# Patient Record
Sex: FEMALE | Race: WHITE | Employment: UNEMPLOYED | URBAN - METROPOLITAN AREA
[De-identification: names, ages, dates, MRNs, and addresses within clinical notes are randomized per-mention and may not be internally consistent; named-entity substitution may affect disease eponyms.]

---

## 2017-03-13 ENCOUNTER — OFFICE VISIT (OUTPATIENT)
Dept: INTERNAL MEDICINE CLINIC | Age: 57
End: 2017-03-13

## 2017-03-13 VITALS
RESPIRATION RATE: 16 BRPM | DIASTOLIC BLOOD PRESSURE: 76 MMHG | HEIGHT: 61 IN | BODY MASS INDEX: 23.86 KG/M2 | OXYGEN SATURATION: 96 % | WEIGHT: 126.4 LBS | SYSTOLIC BLOOD PRESSURE: 126 MMHG | HEART RATE: 79 BPM | TEMPERATURE: 98.5 F

## 2017-03-13 DIAGNOSIS — F51.01 PRIMARY INSOMNIA: ICD-10-CM

## 2017-03-13 DIAGNOSIS — E03.9 ACQUIRED HYPOTHYROIDISM: ICD-10-CM

## 2017-03-13 DIAGNOSIS — M54.2 NECK PAIN: Primary | ICD-10-CM

## 2017-03-13 DIAGNOSIS — Z11.59 NEED FOR HEPATITIS C SCREENING TEST: ICD-10-CM

## 2017-03-13 DIAGNOSIS — R00.2 PALPITATIONS: ICD-10-CM

## 2017-03-13 RX ORDER — ZOLPIDEM TARTRATE 10 MG/1
5-10 TABLET ORAL
Qty: 30 TAB | Refills: 1 | Status: SHIPPED | OUTPATIENT
Start: 2017-03-13 | End: 2018-06-18 | Stop reason: SDUPTHER

## 2017-03-13 NOTE — PROGRESS NOTES
HISTORY OF PRESENT ILLNESS  Andriette Socorro is a 60 y.o. female. HPI  Presents with several weeks of feeling of heart beating hard in her chest. Some left neck discomfort as well. No relation to exertion. No headache or dizziness. She does have a large family history of CAD in her brother and mom. No exertional SOB. NO PND or orthopnea. No edema. No change in bowels or bladder. No fever or chills. Some issues with stress as well as insomnia and trouble going to sleep mostly. Some staying asleep issues as well. 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 90 Tab 3     No current facility-administered medications on file prior to visit.         ROS  Per HPI  Physical Exam  Physical Examination: General appearance - alert, well appearing, and in no distress  Neck - supple, no significant adenopathy, carotids upstroke normal bilaterally, no bruits, thyroid exam: thyroid is normal in size without nodules or tenderness  Lymphatics - no palpable lymphadenopathy, no hepatosplenomegaly  Chest - clear to auscultation, no wheezes, rales or rhonchi, symmetric air entry  Heart - normal rate, regular rhythm, normal S1, S2, no murmurs, rubs, clicks or gallops  Abdomen - soft, nontender, nondistended, no masses or organomegaly  Neurological - alert, oriented, normal speech, no focal findings or movement disorder noted  Musculoskeletal - no joint tenderness, deformity or swelling  Extremities - peripheral pulses normal, no pedal edema, no clubbing or cyanosis  EKG: normal EKG, normal sinus rhythm, unchanged from previous tracings    Ivelisse King MD      ASSESSMENT and PLAN  Lazarus Rattan was seen today for palpitations, neck pain, hypothyroidism and labs. Diagnoses and all orders for this visit:    Neck pain - concern for occult CAD versus musculoskeletal apin  -     AMB POC EKG ROUTINE W/ 12 LEADS, INTER & REP  -     ECHO TTE STRESS EXRCSE COMP W OR WO CONTR; Future    Need for hepatitis C screening test  -     HEPATITIS C AB    Palpitations - appear short lived and benign  -     AMB POC EKG ROUTINE W/ 12 LEADS, INTER & REP  -     CBC WITH AUTOMATED DIFF  -     LIPID PANEL  -     METABOLIC PANEL, COMPREHENSIVE  -     UA/M W/RFLX CULTURE, ROUTINE  -     ECHO TTE STRESS EXRCSE COMP W OR WO CONTR; Future    Acquired hypothyroidism - check levels and adjust meds. -     T4, FREE  -     TSH 3RD GENERATION    Primary insomnia - trial of ambien and call if not helping. Other orders  -     zolpidem (AMBIEN) 10 mg tablet; Take 0.5-1 Tabs by mouth nightly as needed for Sleep. Max Daily Amount: 10 mg. Follow-up Disposition: Not on File   Advised her to call back or return to office if symptoms worsen/change/persist.  Discussed expected course/resolution/complications of diagnosis in detail with patient. Medication risks/benefits/costs/interactions/alternatives discussed with patient. She was given an after visit summary which includes diagnoses, current medications, & vitals. She expressed understanding with the diagnosis and plan.

## 2017-03-13 NOTE — MR AVS SNAPSHOT
Visit Information Date & Time Provider Department Dept. Phone Encounter #  
 3/13/2017 11:00 AM Christine Su MD [facility] Internal Medicine [phone] [id number] Upcoming Health Maintenance Date Due Hepatitis C Screening 1960 Pneumococcal 19-64 Highest Risk (1 of 3 - PCV13) 11/15/1977 DTaP/Tdap/Td series (1 - Tdap) 8/2/2003 INFLUENZA AGE 9 TO ADULT 8/1/2016 PAP AKA CERVICAL CYTOLOGY 10/20/2017 BREAST CANCER SCRN MAMMOGRAM 11/21/2017 COLONOSCOPY 9/20/2020 Allergies as of 3/13/2017  Review Complete On: 3/13/2017 By: Eula Marcial LPN Severity Noted Reaction Type Reactions Terpin Hydrate High 10/01/2010    Swelling Throat swelling Current Immunizations  Reviewed on 7/6/2015 Name Date  
 TD Vaccine 8/1/2003 Not reviewed this visit You Were Diagnosed With   
  
 Codes Comments Neck pain    -  Primary ICD-10-CM: M54.2 ICD-9-CM: 723.1 Need for hepatitis C screening test     ICD-10-CM: Z11.59 
ICD-9-CM: V73.89 Palpitations     ICD-10-CM: R00.2 ICD-9-CM: 785.1 Acquired hypothyroidism     ICD-10-CM: E03.9 ICD-9-CM: 048. 9 Vitals BP Pulse Temp Resp Height(growth percentile) Weight(growth percentile) 126/76 79 98.5 °F (36.9 °C) (Oral) 16 5' 1.13\" (1.553 m) 126 lb 6.4 oz (57.3 kg) LMP SpO2 BMI OB Status Smoking Status 05/04/2010 96% 23.78 kg/m2 Postmenopausal Never Smoker Vitals History BMI and BSA Data Body Mass Index Body Surface Area 23.78 kg/m 2 1.57 m 2 Preferred Pharmacy Pharmacy Name Phone [id number] [address], [address] [facility] [phone] Your Updated Medication List  
  
   
This list is accurate as of: 3/13/17 11:47 AM.  Always use your most recent med list.  
  
  
  
  
 levothyroxine 75 mcg tablet Commonly known as:  SYNTHROID Take 1 Tab by mouth Daily (before breakfast). We Performed the Following AMB POC EKG ROUTINE W/ 12 LEADS, INTER & REP [69267 CPT(R)] CBC WITH AUTOMATED DIFF [50833 CPT(R)] HEPATITIS C AB [04869 CPT(R)] LIPID PANEL [44000 CPT(R)] METABOLIC PANEL, COMPREHENSIVE [21488 CPT(R)] T4, FREE [id number] CPT(R)] TSH 3RD GENERATION [91443 CPT(R)] UA/M W/RFLX CULTURE, ROUTINE [[id number] Custom] To-Do List   
 03/13/2017 ECHO:  ECHO TTE STRESS EXRCSE COMP W OR WO CONTR Introducing \[facility]\"" & HEALTH SERVICES! Dear Enrico Ireland: Thank you for requesting a [employer] account. Our records indicate that you already have an active [employer] account. You can access your account anytime at [email or web address] Did you know that you can access your hospital and ER discharge instructions at any time in [employer]? You can also review all of your test results from your hospital stay or ER visit. Additional Information If you have questions, please visit the Frequently Asked Questions section of the [employer] website at [email or web address] Remember, [employer] is NOT to be used for urgent needs. For medical emergencies, dial 911. Now available from your iPhone and Android! Please provide this summary of care documentation to your next provider. Your primary care clinician is listed as Shaylee [phone] If you have any questions after today's visit, please call [phone].

## 2017-03-13 NOTE — PROGRESS NOTES
Chief Complaint   Patient presents with    Palpitations     intermittent for \"a couple of months\"      Neck Pain     left sided yesterday dull ache lasting 30 mintutes     Hypothyroidism    Labs

## 2017-03-14 ENCOUNTER — TELEPHONE (OUTPATIENT)
Dept: INTERNAL MEDICINE CLINIC | Age: 57
End: 2017-03-14

## 2017-03-14 LAB
ALBUMIN SERPL-MCNC: 4.7 G/DL (ref 3.5–5.5)
ALBUMIN/GLOB SERPL: 1.5 {RATIO} (ref 1.2–2.2)
ALP SERPL-CCNC: 77 IU/L (ref 39–117)
ALT SERPL-CCNC: 22 IU/L (ref 0–32)
APPEARANCE UR: CLEAR
AST SERPL-CCNC: 18 IU/L (ref 0–40)
BACTERIA #/AREA URNS HPF: NORMAL /[HPF]
BASOPHILS # BLD AUTO: 0 X10E3/UL (ref 0–0.2)
BASOPHILS NFR BLD AUTO: 0 %
BILIRUB SERPL-MCNC: 0.5 MG/DL (ref 0–1.2)
BILIRUB UR QL STRIP: NEGATIVE
BUN SERPL-MCNC: 14 MG/DL (ref 6–24)
BUN/CREAT SERPL: 20 (ref 9–23)
CALCIUM SERPL-MCNC: 9.7 MG/DL (ref 8.7–10.2)
CASTS URNS QL MICRO: NORMAL /LPF
CHLORIDE SERPL-SCNC: 99 MMOL/L (ref 96–106)
CHOLEST SERPL-MCNC: 213 MG/DL (ref 100–199)
CO2 SERPL-SCNC: 24 MMOL/L (ref 18–29)
COLOR UR: YELLOW
CREAT SERPL-MCNC: 0.69 MG/DL (ref 0.57–1)
EOSINOPHIL # BLD AUTO: 0 X10E3/UL (ref 0–0.4)
EOSINOPHIL NFR BLD AUTO: 0 %
EPI CELLS #/AREA URNS HPF: NORMAL /HPF
ERYTHROCYTE [DISTWIDTH] IN BLOOD BY AUTOMATED COUNT: 13.2 % (ref 12.3–15.4)
GLOBULIN SER CALC-MCNC: 3.2 G/DL (ref 1.5–4.5)
GLUCOSE SERPL-MCNC: 86 MG/DL (ref 65–99)
GLUCOSE UR QL: NEGATIVE
HCT VFR BLD AUTO: 41.1 % (ref 34–46.6)
HCV AB S/CO SERPL IA: 0.1 S/CO RATIO (ref 0–0.9)
HDLC SERPL-MCNC: 63 MG/DL
HGB BLD-MCNC: 13.8 G/DL (ref 11.1–15.9)
HGB UR QL STRIP: ABNORMAL
IMM GRANULOCYTES # BLD: 0 X10E3/UL (ref 0–0.1)
IMM GRANULOCYTES NFR BLD: 0 %
KETONES UR QL STRIP: NEGATIVE
LDLC SERPL CALC-MCNC: 131 MG/DL (ref 0–99)
LEUKOCYTE ESTERASE UR QL STRIP: NEGATIVE
LYMPHOCYTES # BLD AUTO: 1.8 X10E3/UL (ref 0.7–3.1)
LYMPHOCYTES NFR BLD AUTO: 23 %
MCH RBC QN AUTO: 30.2 PG (ref 26.6–33)
MCHC RBC AUTO-ENTMCNC: 33.6 G/DL (ref 31.5–35.7)
MCV RBC AUTO: 90 FL (ref 79–97)
MICRO URNS: ABNORMAL
MONOCYTES # BLD AUTO: 0.6 X10E3/UL (ref 0.1–0.9)
MONOCYTES NFR BLD AUTO: 7 %
MUCOUS THREADS URNS QL MICRO: PRESENT
NEUTROPHILS # BLD AUTO: 5.3 X10E3/UL (ref 1.4–7)
NEUTROPHILS NFR BLD AUTO: 70 %
NITRITE UR QL STRIP: NEGATIVE
PH UR STRIP: 6 [PH] (ref 5–7.5)
PLATELET # BLD AUTO: 286 X10E3/UL (ref 150–379)
POTASSIUM SERPL-SCNC: 4 MMOL/L (ref 3.5–5.2)
PROT SERPL-MCNC: 7.9 G/DL (ref 6–8.5)
PROT UR QL STRIP: NEGATIVE
RBC # BLD AUTO: 4.57 X10E6/UL (ref 3.77–5.28)
RBC #/AREA URNS HPF: NORMAL /HPF
SODIUM SERPL-SCNC: 141 MMOL/L (ref 134–144)
SP GR UR: 1.01 (ref 1–1.03)
T4 FREE SERPL-MCNC: 1.42 NG/DL (ref 0.82–1.77)
TRIGL SERPL-MCNC: 95 MG/DL (ref 0–149)
TSH SERPL DL<=0.005 MIU/L-ACNC: 0.03 UIU/ML (ref 0.45–4.5)
URINALYSIS REFLEX, 377202: ABNORMAL
UROBILINOGEN UR STRIP-MCNC: 0.2 MG/DL (ref 0.2–1)
VLDLC SERPL CALC-MCNC: 19 MG/DL (ref 5–40)
WBC # BLD AUTO: 7.7 X10E3/UL (ref 3.4–10.8)
WBC #/AREA URNS HPF: NORMAL /HPF

## 2017-03-14 NOTE — TELEPHONE ENCOUNTER
Patient asked for a callback from Dr. Samantha Iniguez. Says there is something about her exam she forgot to ask him yesterday. Would not say what it was about.

## 2017-03-31 ENCOUNTER — CLINICAL SUPPORT (OUTPATIENT)
Dept: CARDIOLOGY CLINIC | Age: 57
End: 2017-03-31

## 2017-03-31 DIAGNOSIS — R07.89 OTHER CHEST PAIN: Primary | ICD-10-CM

## 2017-04-03 ENCOUNTER — OFFICE VISIT (OUTPATIENT)
Dept: INTERNAL MEDICINE CLINIC | Age: 57
End: 2017-04-03

## 2017-04-03 VITALS
TEMPERATURE: 98 F | OXYGEN SATURATION: 98 % | BODY MASS INDEX: 23.33 KG/M2 | SYSTOLIC BLOOD PRESSURE: 122 MMHG | HEIGHT: 61 IN | RESPIRATION RATE: 14 BRPM | HEART RATE: 67 BPM | WEIGHT: 123.6 LBS | DIASTOLIC BLOOD PRESSURE: 84 MMHG

## 2017-04-03 DIAGNOSIS — G90.01 CAROTIDYNIA: ICD-10-CM

## 2017-04-03 DIAGNOSIS — K62.5 RECTAL BLEEDING: Primary | ICD-10-CM

## 2017-04-03 DIAGNOSIS — M54.2 NECK PAIN: ICD-10-CM

## 2017-04-03 RX ORDER — IBUPROFEN 200 MG
400 TABLET ORAL
COMMUNITY
Start: 2017-04-03 | End: 2018-12-13

## 2017-04-03 NOTE — MR AVS SNAPSHOT
Visit Information Date & Time Provider Department Dept. Phone Encounter #  
 4/3/2017 11:15 AM Mariajose Lake MD [address] [address] Internal Medicine [phone] [id number] Upcoming Health Maintenance Date Due Pneumococcal 19-64 Highest Risk (1 of 3 - PCV13) 11/15/1977 DTaP/Tdap/Td series (1 - Tdap) 8/2/2003 PAP AKA CERVICAL CYTOLOGY 10/20/2017 BREAST CANCER SCRN MAMMOGRAM 11/21/2017 COLONOSCOPY 9/20/2020 Allergies as of 4/3/2017  Review Complete On: 4/3/2017 By: Silvestre Cobb LPN Severity Noted Reaction Type Reactions Terpin Hydrate High 10/01/2010    Swelling Throat swelling Current Immunizations  Reviewed on 7/6/2015 Name Date  
 TD Vaccine 8/1/2003 Not reviewed this visit You Were Diagnosed With   
  
 Codes Comments Rectal bleeding    -  Primary ICD-10-CM: K62.5 ICD-9-CM: 569.3 Carotidynia     ICD-10-CM: G90.01 
ICD-9-CM: 337.01 Neck pain     ICD-10-CM: M54.2 ICD-9-CM: 723.1 Vitals BP Pulse Temp Resp Height(growth percentile) Weight(growth percentile) 122/84 67 98 °F (36.7 °C) (Oral) 14 5' 1.1\" (1.552 m) 123 lb 9.6 oz (56.1 kg) LMP SpO2 BMI OB Status Smoking Status 05/04/2010 98% 23.28 kg/m2 Postmenopausal Never Smoker Vitals History BMI and BSA Data Body Mass Index Body Surface Area  
 23.28 kg/m 2 1.56 m 2 Preferred Pharmacy Pharmacy Name Phone [id number] [address] [phone] Your Updated Medication List  
  
   
This list is accurate as of: 4/3/17 12:57 PM.  Always use your most recent med list. ADVIL 200 mg tablet Generic drug:  ibuprofen Take 2 Tabs by mouth two (2) times daily (after meals). levothyroxine 75 mcg tablet Commonly known as:  SYNTHROID Take 1 Tab by mouth Daily (before breakfast). zolpidem 10 mg tablet Commonly known as:  AMBIEN  
 Take 0.5-1 Tabs by mouth nightly as needed for Sleep. Max Daily Amount: 10 mg. We Performed the Following REFERRAL TO COLON AND RECTAL SURGERY [REF17 Custom] Comments:  
 Please evaluate patient for rectal bleeding. To-Do List   
 04/03/2017 Imaging:  DUPLEX CAROTID BILATERAL Referral Information Referral ID Referred By Referred To  
  
 [id number] Gabe Carrel, [facility] [address] [place], [address] Visits Status Start Date End Date 1 New Request 4/3/17 4/3/18 If your referral has a status of pending review or denied, additional information will be sent to support the outcome of this decision. Introducing [facility] & HEALTH SERVICES! Dear Zechariah Mckeon: Thank you for requesting a [employer] account. Our records indicate that you already have an active [employer] account. You can access your account anytime at [email or web address] Did you know that you can access your hospital and ER discharge instructions at any time in [employer]? You can also review all of your test results from your hospital stay or ER visit. Additional Information If you have questions, please visit the Frequently Asked Questions section of the [employer] website at [email or web address] Remember, [employer] is NOT to be used for urgent needs. For medical emergencies, dial 911. Now available from your iPhone and Android! Please provide this summary of care documentation to your next provider. Your primary care clinician is listed as Shaylee [phone] If you have any questions after today's visit, please call [phone].

## 2017-04-04 NOTE — PROGRESS NOTES
HISTORY OF PRESENT ILLNESS  Estella Elizalde is a 60 y.o. female. HPI  Presents for a followup of recent testing. Stress test was fine. Ongoing issues with left neck soreness and some burning pain. Also some left ear pulsing and taping mostly noted when she exerts herself on two occasions. No dizziness. Some right ear tinnitus for years. No exertional chest pain or SOB. No cough or wheeze. No fever or chills. No palpitations. Past Medical History:   Diagnosis Date    Cancer ([facility]) 6/2010     Squamous cell face    Diverticulosis     Environmental allergies     melisa hydrate    MVP (mitral valve prolapse)     Thyroid disease     hypo    Unspecified hypothyroidism 1/31/2011     Past Surgical History:   Procedure Laterality Date    ENDOSCOPY, COLON, DIAGNOSTIC  8/16/2010    10 year fu -- Howie Opitz ORAL SURGERY PROCEDURE      [place] MOHS, 1 STAGE, HEAD/NECK/HAND/FEET/GENTIAL       Current Outpatient Prescriptions on File Prior to Visit   Medication Sig Dispense Refill    zolpidem (AMBIEN) 10 mg tablet Take 0.5-1 Tabs by mouth nightly as needed for Sleep. Max Daily Amount: 10 mg. 30 Tab 1    levothyroxine (SYNTHROID) 75 mcg tablet Take 1 Tab by mouth Daily (before breakfast). 90 Tab 3     No current facility-administered medications on file prior to visit. ROS  Per HPI. Some bleeding with each BM on the tissue only. No melena or pain. Physical Exam  Physical Examination: Mental status - alert, oriented to person, place, and time  Ears - bilateral TM's and external ear canals normal  Nose - normal and patent, no erythema, discharge or polyps  Mouth - mucous membranes moist, pharynx normal without lesions  Neck - soreness along the left carotid but no mass.    Lymphatics - no palpable lymphadenopathy, no hepatosplenomegaly  Chest - clear to auscultation, no wheezes, rales or rhonchi, symmetric air entry  Heart - normal rate, regular rhythm, normal S1, S2, no murmurs, rubs, clicks or gallops  Abdomen - soft, nontender, nondistended, no masses or organomegaly  Extremities - peripheral pulses normal, no pedal edema, no clubbing or cyanosis    ASSESSMENT and PLAN  Abner was seen today for neck pain. Diagnoses and all orders for this visit:    Rectal bleeding - hemorrhoids - will see surgery to address  -     REFERRAL TO COLON AND RECTAL SURGERY    Carotidynia - unclear etiology. -     DUPLEX CAROTID BILATERAL; Future    Neck pain - per above. -     DUPLEX CAROTID BILATERAL; Future   Likely tinnitus - if the above is negative. Will get ENT involved and consider MRI and MRA. Follow-up Disposition: after the above. Advised her to call back or return to office if symptoms worsen/change/persist.  Discussed expected course/resolution/complications of diagnosis in detail with patient. Medication risks/benefits/costs/interactions/alternatives discussed with patient. She was given an after visit summary which includes diagnoses, current medications, & vitals. She expressed understanding with the diagnosis and plan.

## 2017-04-07 ENCOUNTER — CLINICAL SUPPORT (OUTPATIENT)
Dept: CARDIOLOGY CLINIC | Age: 57
End: 2017-04-07

## 2017-04-07 DIAGNOSIS — R09.89 LEFT CAROTID BRUIT: Primary | ICD-10-CM

## 2017-04-07 DIAGNOSIS — I65.22 CAROTID ARTERY PLAQUE, LEFT: ICD-10-CM

## 2017-04-07 NOTE — PROCEDURES
[facility]  *** FINAL REPORT ***    Name: Ronel Spears  MRN: [id number]        Outpatient  : 15 Nov 1960  HIS Order #: [id number]  [id number] [facility] Visit #: [id number]  Date: 2017    TYPE OF TEST: Cerebrovascular Duplex    REASON FOR TEST  Pulsing in left ear / soreness along right carotid artery    Right Carotid:-             Proximal               Mid                 Distal  cm/s  Systolic  Diastolic  Systolic  Diastolic  Systolic  Diastolic  CCA:     25.9      27.0                            69.0      17.0  Bulb:  ECA:    106.0      14.0  ICA:     58.0      25.0       76.0      34.0      107.0      47.0  ICA/CCA:  0.8       1.5    ICA Stenosis: Normal    Right Vertebral:-  Finding: Antegrade  Sys:       72.0  Arielle:       23.0    Right Subclavian:    Left Carotid:-            Proximal                Mid                 Distal  cm/s  Systolic  Diastolic  Systolic  Diastolic  Systolic  Diastolic  CCA:     69.2      29.0                            86.0      28.0  Bulb:  ECA:     81.0      12.0  ICA:     74.0      24.0       74.0      32.0      116.0      45.0  ICA/CCA:  0.9       0.9    ICA Stenosis: <50%    Left Vertebral:-  Finding: Antegrade  Sys:       64.0  Arielle:       26.0    Left Subclavian:    INTERPRETATION/FINDINGS  PROCEDURE:  Evaluation of the extracranial cerebrovascular arteries  with ultrasound (B-mode imaging, pulsed Doppler, color Doppler). Includes the common carotid, internal carotid, external carotid, and  vertebral arteries. FINDINGS:  Right:  There is no appreciable plaque formation or flow disturbance  within the right carotid system. Peak velocities are normal.  Left:  A small, focal plaque is noted at the origin of the internal  carotid artery. There are no significant color flow filling defects.    Peak velocities are normal.    IMPRESSION:  1)  Right side appears normal.  2)  Left side exhibits minimal plaque with 0-9% stenosis of the left  internal carotid artery. 3)  Vertebral flow is antegrade bilaterally. ADDITIONAL COMMENTS    I have personally reviewed the data relevant to the interpretation of  this  study. TECHNOLOGIST: William Stafford RVT, RDMS, RDCS  Signed: 04/07/2017 08:31 AM    PHYSICIAN: Miguel A Pitt.  Eze Pham MD  Signed: 04/07/2017 01:41 PM

## 2017-04-11 ENCOUNTER — TELEPHONE (OUTPATIENT)
Dept: INTERNAL MEDICINE CLINIC | Age: 57
End: 2017-04-11

## 2017-04-11 NOTE — TELEPHONE ENCOUNTER
Spoke with pt and advised that her bilateral carotid dopplers were negative.  To see ENT if s/sx persist.

## 2017-04-11 NOTE — TELEPHONE ENCOUNTER
----- Message from Mylene Robb MD sent at 4/7/2017  4:39 PM EDT -----  Notify negative and see ENT if needed.

## 2017-05-19 ENCOUNTER — HOSPITAL ENCOUNTER (OUTPATIENT)
Dept: CT IMAGING | Age: 57
Discharge: HOME | End: 2017-05-19
Attending: SPECIALIST
Payer: OTHER GOVERNMENT

## 2017-05-19 DIAGNOSIS — H93.13 TINNITUS OF BOTH EARS: ICD-10-CM

## 2017-05-19 DIAGNOSIS — H90.3 SENSORY HEARING LOSS, BILATERAL: ICD-10-CM

## 2017-05-19 PROCEDURE — 74011000258 HC RX REV CODE- 258: Performed by: SPECIALIST

## 2017-05-19 PROCEDURE — 74011636320 HC RX REV CODE- 636/320: Performed by: SPECIALIST

## 2017-05-19 PROCEDURE — 70496 CT ANGIOGRAPHY HEAD: CPT

## 2017-05-19 RX ORDER — SODIUM CHLORIDE 0.9 % (FLUSH) 0.9 %
10 SYRINGE (ML) INJECTION
Status: COMPLETED | OUTPATIENT
Start: 2017-05-19 | End: 2017-05-19

## 2017-05-19 RX ADMIN — Medication 10 ML: at 10:41

## 2017-05-19 RX ADMIN — IOPAMIDOL 100 ML: 755 INJECTION, SOLUTION INTRAVENOUS at 10:41

## 2017-05-19 RX ADMIN — SODIUM CHLORIDE 100 ML: 900 INJECTION, SOLUTION INTRAVENOUS at 10:41

## 2017-12-06 ENCOUNTER — HOSPITAL ENCOUNTER (OUTPATIENT)
Dept: MAMMOGRAPHY | Age: 57
Discharge: HOME | End: 2017-12-06
Attending: OBSTETRICS & GYNECOLOGY
Payer: OTHER GOVERNMENT

## 2017-12-06 DIAGNOSIS — Z12.39 BREAST SCREENING: ICD-10-CM

## 2017-12-06 PROCEDURE — 77067 SCR MAMMO BI INCL CAD: CPT

## 2017-12-07 DIAGNOSIS — E03.9 ACQUIRED HYPOTHYROIDISM: Primary | ICD-10-CM

## 2017-12-09 LAB
T4 FREE SERPL-MCNC: 1.4 NG/DL (ref 0.82–1.77)
TSH SERPL DL<=0.005 MIU/L-ACNC: 0.11 UIU/ML (ref 0.45–4.5)

## 2018-01-15 RX ORDER — LEVOTHYROXINE SODIUM 75 UG/1
75 TABLET ORAL
Qty: 90 TAB | Refills: 3 | Status: SHIPPED | OUTPATIENT
Start: 2018-01-15 | End: 2019-03-14 | Stop reason: SDUPTHER

## 2018-01-15 RX ORDER — LEVOTHYROXINE SODIUM 75 UG/1
75 TABLET ORAL
Qty: 90 TAB | Refills: 3 | Status: SHIPPED | OUTPATIENT
Start: 2018-01-15 | End: 2018-01-15 | Stop reason: CLARIF

## 2018-01-15 NOTE — TELEPHONE ENCOUNTER
Orders Placed This Encounter    DISCONTD: levothyroxine (SYNTHROID) 75 mcg tablet     Sig: Take 1 Tab by mouth Daily (before breakfast). Dispense:  90 Tab     Refill:  3    levothyroxine (SYNTHROID) 75 mcg tablet     Sig: Take 1 Tab by mouth Daily (before breakfast). Dispense:  90 Tab     Refill:  3     The above medication refills were approved via verbal order by Dr. Prosper Cortez III.

## 2018-02-21 ENCOUNTER — OFFICE VISIT (OUTPATIENT)
Dept: INTERNAL MEDICINE CLINIC | Age: 58
End: 2018-02-21

## 2018-02-21 VITALS
BODY MASS INDEX: 22.84 KG/M2 | DIASTOLIC BLOOD PRESSURE: 66 MMHG | SYSTOLIC BLOOD PRESSURE: 106 MMHG | TEMPERATURE: 98.7 F | OXYGEN SATURATION: 97 % | HEIGHT: 61 IN | HEART RATE: 70 BPM | WEIGHT: 121 LBS | RESPIRATION RATE: 14 BRPM

## 2018-02-21 DIAGNOSIS — K14.8 TONGUE LESION: Primary | ICD-10-CM

## 2018-02-21 NOTE — PROGRESS NOTES
HPI:  Sarah Pizarro is a 59y.o. year old female who is here for a spot on the left side of her tongue that has been present for more than a month. She said it started back around [place] time and when she was eating a lot of nuts. She thought that it might be related to that. She stopped eating the knots and over the last month its return. She has noticed a small white spot with a magnifying mirror at home. She is actually seeing the dentist tomorrow for an issue with a crown as well as for cleaning. She denies any fevers or chills. No difficulty swallowing. No nausea vomiting. Past Medical History:   Diagnosis Date    Diverticulosis     Environmental allergies     melisa hydrate    MVP (mitral valve prolapse)     Skin cancer 06/2010     Squamous cell face    Unspecified hypothyroidism 1/31/2011       Past Surgical History:   Procedure Laterality Date    ENDOSCOPY, COLON, DIAGNOSTIC  8/16/2010    10 year fu -- Shelly Infante ORAL SURGERY PROCEDURE      [place] MOHS, 1 STAGE, HEAD/NECK/HAND/FEET/GENTIAL         Prior to Admission medications    Medication Sig Start Date End Date Taking? Authorizing Provider   levothyroxine (SYNTHROID) 75 mcg tablet Take 1 Tab by mouth Daily (before breakfast). Patient taking differently: Take 75 mcg by mouth Daily (before breakfast). Indications: hypothyroidism 1/15/18  Yes Daisy Núñez III, MD   ibuprofen (ADVIL) 200 mg tablet Take 2 Tabs by mouth two (2) times daily (after meals). 4/3/17  Yes Daisy Núñez III, MD   zolpidem (AMBIEN) 10 mg tablet Take 0.5-1 Tabs by mouth nightly as needed for Sleep. Max Daily Amount: 10 mg. 3/13/17  Yes Tyler Tello MD       Social History     Social History    Marital status:      Spouse name: N/A    Number of children: N/A    Years of education: N/A     Occupational History    Not on file.      Social History Main Topics    Smoking status: Never Smoker    Smokeless tobacco: Never Used    Alcohol use 1.0 oz/week     2 Standard drinks or equivalent per week      Comment: 1-2 per month    Drug use: No    Sexual activity: Yes     Partners: Male     Birth control/ protection: None     Other Topics Concern    Not on file     Social History Narrative          ROS  Per HPI    Visit Vitals    /66    Pulse 70    Temp 98.7 °F (37.1 °C) (Oral)    Resp 14    Ht 5' 1.1\" (1.552 m)    Wt 121 lb (54.9 kg)    LMP 05/04/2010    SpO2 97%    BMI 22.79 kg/m2         Physical Exam   Physical Examination: General appearance - alert, well appearing, and in no distress  Ears - bilateral TM's and external ear canals normal  Mouth - mucous membranes moist, pharynx normal without lesions and the tongue does have a small area of erythema at the base at its insertion just in front of her left tonsillar pillar. There is a small white papule in the center of this erythema. Neck - supple, no significant adenopathy  Lymphatics - no palpable lymphadenopathy, no hepatosplenomegaly  Chest - clear to auscultation, no wheezes, rales or rhonchi, symmetric air entry  Heart - normal rate, regular rhythm, normal S1, S2, no murmurs, rubs, clicks or gallops      Assessment/Plan:  Diagnoses and all orders for this visit:    1. Tongue lesion -may be an inflamed salivary gland. She will address it with the dentist tomorrow. If it does not resolve she will see an oral surgeon for evaluation.  -     REFERRAL TO ORAL MAXILLOFACIAL SURGERY       Follow-up Disposition: as needed       Advised her to call back or return to office if symptoms worsen/change/persist.  Discussed expected course/resolution/complications of diagnosis in detail with patient. Medication risks/benefits/costs/interactions/alternatives discussed with patient. She was given an after visit summary which includes diagnoses, current medications, & vitals. She expressed understanding with the diagnosis and plan.

## 2018-04-18 ENCOUNTER — OFFICE VISIT (OUTPATIENT)
Dept: INTERNAL MEDICINE CLINIC | Age: 58
End: 2018-04-18

## 2018-04-18 VITALS
HEIGHT: 61 IN | HEART RATE: 64 BPM | RESPIRATION RATE: 10 BRPM | TEMPERATURE: 99 F | SYSTOLIC BLOOD PRESSURE: 138 MMHG | BODY MASS INDEX: 23.22 KG/M2 | WEIGHT: 123 LBS | DIASTOLIC BLOOD PRESSURE: 78 MMHG | OXYGEN SATURATION: 99 %

## 2018-04-18 DIAGNOSIS — J32.0 CHRONIC MAXILLARY SINUSITIS: Primary | ICD-10-CM

## 2018-04-18 DIAGNOSIS — E03.9 ACQUIRED HYPOTHYROIDISM: ICD-10-CM

## 2018-04-18 RX ORDER — FLUTICASONE PROPIONATE 50 MCG
2 SPRAY, SUSPENSION (ML) NASAL DAILY
Qty: 1 BOTTLE | Refills: 2 | Status: SHIPPED | OUTPATIENT
Start: 2018-04-18 | End: 2019-04-15 | Stop reason: ALTCHOICE

## 2018-04-18 RX ORDER — AMOXICILLIN AND CLAVULANATE POTASSIUM 875; 125 MG/1; MG/1
1 TABLET, FILM COATED ORAL EVERY 12 HOURS
Qty: 28 TAB | Refills: 0 | Status: SHIPPED | OUTPATIENT
Start: 2018-04-18 | End: 2018-12-13 | Stop reason: ALTCHOICE

## 2018-04-18 NOTE — PROGRESS NOTES
HPI:  Isamar Solano is a 59y.o. year old female who is here for several issues. She had a small white spot on her left tongue the last time she was here. She saw her dentist who apparently curetted it and has resolved. She is also had some ongoing issues with sinus pain on the right side of her face along her gumline. In the past she has been told that she had sinus issues on that right side after her previous oral surgery. She seen the dentist 3 times and been treated with a one-week course of amoxicillin. The swelling and discomfort have improved but not resolved. She does have chronic nasal congestion with postnasal drip. No cough or wheeze. No fevers or chills. No nausea or vomiting. Past Medical History:   Diagnosis Date    Diverticulosis     Environmental allergies     melisa hydrate    MVP (mitral valve prolapse)     Skin cancer 06/2010     Squamous cell face    Unspecified hypothyroidism 1/31/2011       Past Surgical History:   Procedure Laterality Date    ENDOSCOPY, COLON, DIAGNOSTIC  8/16/2010    10 year fu -- Cosmo [facility] ORAL SURGERY PROCEDURE      [place] MOHS, 1 STAGE, HEAD/NECK/HAND/FEET/GENTIAL         Prior to Admission medications    Medication Sig Start Date End Date Taking? Authorizing Provider   fluticasone (FLONASE) 50 mcg/actuation nasal spray 2 Sprays by Both Nostrils route daily. 4/18/18  Yes Eugenie Chu III, MD   amoxicillin-clavulanate (AUGMENTIN) 875-125 mg per tablet Take 1 Tab by mouth every twelve (12) hours. 4/18/18  Yes Eugenie Chu III, MD   levothyroxine (SYNTHROID) 75 mcg tablet Take 1 Tab by mouth Daily (before breakfast). Patient taking differently: Take 75 mcg by mouth Daily (before breakfast). Indications: hypothyroidism 1/15/18  Yes Eugenie Chu III, MD   ibuprofen (ADVIL) 200 mg tablet Take 2 Tabs by mouth two (2) times daily (after meals).  4/3/17  Yes Eugenie Chu III, MD   zolpidem (AMBIEN) 10 mg tablet Take 0.5-1 Tabs by mouth nightly as needed for Sleep. Max Daily Amount: 10 mg. 3/13/17  Yes Phylicia Solorzano MD       Social History     Social History    Marital status:      Spouse name: N/A    Number of children: N/A    Years of education: N/A     Occupational History    Not on file. Social History Main Topics    Smoking status: Never Smoker    Smokeless tobacco: Never Used    Alcohol use 1.0 oz/week     2 Standard drinks or equivalent per week      Comment: occasionally    Drug use: No    Sexual activity: Yes     Partners: Male     Birth control/ protection: None     Other Topics Concern    Not on file     Social History Narrative          ROS  Per HPI    Visit Vitals    /78    Pulse 64    Temp 99 °F (37.2 °C) (Oral)    Resp 10    Ht 5' 1\" (1.549 m)    Wt 123 lb (55.8 kg)    LMP 05/04/2010    SpO2 99%    BMI 23.24 kg/m2         Physical Exam   Physical Examination: General appearance - alert, well appearing, and in no distress  Eyes - pupils equal and reactive, extraocular eye movements intact  Ears - bilateral TM's and external ear canals normal  Nose - normal and patent, no erythema, discharge or polyps  Mouth - mucous membranes moist, pharynx normal without lesions and the previous area of tongue white spot has resolved. There is some mild swelling along the right upper arm line lateral aspect. There also is some tenderness across the right maxillary sinus. There is normal transillumination of the sinuses. Neck - supple, no significant adenopathy  Lymphatics - no palpable lymphadenopathy, no hepatosplenomegaly  Chest - clear to auscultation, no wheezes, rales or rhonchi, symmetric air entry  Heart - normal rate, regular rhythm, normal S1, S2, no murmurs, rubs, clicks or gallops  Abdomen - soft, nontender, nondistended, no masses or organomegaly      Assessment/Plan:  Diagnoses and all orders for this visit:    1. Chronic maxillary sinusitis -? Related to anatomy.   Will treat with a longer course of antibiotics as well as nasal steroids and see if this improves. If not would consider CT scan of the sinuses in a couple week as well as an ENT referral.    2. Acquired hypothyroidism -stable on recent labs. Will repeat those this summer. 3.  Tongue lesionresolved    Other orders  -     fluticasone (FLONASE) 50 mcg/actuation nasal spray; 2 Sprays by Both Nostrils route daily. -     amoxicillin-clavulanate (AUGMENTIN) 875-125 mg per tablet; Take 1 Tab by mouth every twelve (12) hours. Follow-up Disposition: Not on File       Advised her to call back or return to office if symptoms worsen/change/persist.  Discussed expected course/resolution/complications of diagnosis in detail with patient. Medication risks/benefits/costs/interactions/alternatives discussed with patient. She was given an after visit summary which includes diagnoses, current medications, & vitals. She expressed understanding with the diagnosis and plan.

## 2018-06-12 DIAGNOSIS — E03.9 ACQUIRED HYPOTHYROIDISM: Primary | ICD-10-CM

## 2018-06-16 LAB
T4 FREE SERPL-MCNC: 1.5 NG/DL (ref 0.82–1.77)
TSH SERPL DL<=0.005 MIU/L-ACNC: 0.08 UIU/ML (ref 0.45–4.5)

## 2018-06-18 DIAGNOSIS — F51.01 PRIMARY INSOMNIA: Primary | ICD-10-CM

## 2018-06-18 RX ORDER — ZOLPIDEM TARTRATE 10 MG/1
5-10 TABLET ORAL
Qty: 30 TAB | Refills: 1 | OUTPATIENT
Start: 2018-06-18 | End: 2019-07-02 | Stop reason: SDUPTHER

## 2018-06-18 NOTE — TELEPHONE ENCOUNTER
Orders Placed This Encounter    zolpidem (AMBIEN) 10 mg tablet     Sig: Take 0.5-1 Tabs by mouth nightly as needed for Sleep. Max Daily Amount: 10 mg. Dispense:  30 Tab     Refill:  1     The above controlled substance refill was called into patients pharmacy - [facility] authorized by Dr. Adi Whitehead.

## 2018-11-22 ENCOUNTER — PATIENT MESSAGE (OUTPATIENT)
Dept: INTERNAL MEDICINE CLINIC | Age: 58
End: 2018-11-22

## 2018-11-23 RX ORDER — CYCLOBENZAPRINE HCL 10 MG
10 TABLET ORAL
Qty: 20 TAB | Refills: 0 | Status: SHIPPED | OUTPATIENT
Start: 2018-11-23 | End: 2018-12-13 | Stop reason: SDUPTHER

## 2018-12-13 ENCOUNTER — OFFICE VISIT (OUTPATIENT)
Dept: INTERNAL MEDICINE CLINIC | Age: 58
End: 2018-12-13

## 2018-12-13 VITALS
SYSTOLIC BLOOD PRESSURE: 129 MMHG | HEIGHT: 61 IN | TEMPERATURE: 98 F | BODY MASS INDEX: 23.3 KG/M2 | DIASTOLIC BLOOD PRESSURE: 85 MMHG | HEART RATE: 83 BPM | WEIGHT: 123.4 LBS | OXYGEN SATURATION: 98 % | RESPIRATION RATE: 12 BRPM

## 2018-12-13 DIAGNOSIS — G57.01 PIRIFORMIS SYNDROME OF RIGHT SIDE: Primary | ICD-10-CM

## 2018-12-13 RX ORDER — IBUPROFEN 200 MG
600 TABLET ORAL
COMMUNITY
Start: 2018-12-13 | End: 2019-04-15 | Stop reason: ALTCHOICE

## 2018-12-13 RX ORDER — CYCLOBENZAPRINE HCL 10 MG
10-20 TABLET ORAL
Qty: 60 TAB | Refills: 1 | Status: SHIPPED | OUTPATIENT
Start: 2018-12-13 | End: 2020-03-11 | Stop reason: SDUPTHER

## 2018-12-14 NOTE — PROGRESS NOTES
HPI:  Maral Martinez is a 59y.o. year old female who is here for a routine visit:    Presents for pain in the right buttocks for the last few weeks. It seems to be most notable when she sits for a long period of time. When she gets up the pain is significant. The pain does not radiate to the legs. There is no numbness, tingling, or weakness. No bowel or bladder change. Been using some of the muscle relaxers and ibuprofen with some success. She is continued to do her running without much difficulty. Past Medical History:   Diagnosis Date    Diverticulosis     Environmental allergies     melisa hydrate    MVP (mitral valve prolapse)     Skin cancer 06/2010     Squamous cell face    Unspecified hypothyroidism 1/31/2011       Past Surgical History:   Procedure Laterality Date    ENDOSCOPY, COLON, DIAGNOSTIC  8/16/2010    10 year fu -- Oziel Locke    [place] MOHS, 1 STAGE, HEAD/NECK/HAND/FEET/GENTIAL      [place] U[place] ORAL SURG PROC BY REPORT         Prior to Admission medications    Medication Sig Start Date End Date Taking? Authorizing Provider   cyclobenzaprine (FLEXERIL) 10 mg tablet Take 1-2 Tabs by mouth three (3) times daily as needed for Muscle Spasm(s). 12/13/18  Yes Elsie Elliott MD   ibuprofen (ADVIL) 200 mg tablet Take 3 Tabs by mouth every eight (8) hours as needed for Pain. 12/13/18  Yes Elsie Elliott MD   zolpidem (AMBIEN) 10 mg tablet Take 0.5-1 Tabs by mouth nightly as needed for Sleep. Max Daily Amount: 10 mg. 6/18/18  Yes Elsie Elliott MD   levothyroxine (SYNTHROID) 75 mcg tablet Take 1 Tab by mouth Daily (before breakfast). Patient taking differently: Take 75 mcg by mouth Daily (before breakfast). Indications: hypothyroidism 1/15/18  Yes Elsie Elliott MD   fluticasone [facility]) 50 mcg/actuation nasal spray 2 Sprays by Both Nostrils route daily.  4/18/18   Elsie Elliott MD       Social History     Socioeconomic History    Marital status:      Spouse name: Not on file    Number of children: Not on file    Years of education: Not on file    Highest education level: Not on file   Social Needs    Financial resource strain: Not on file    Food insecurity - worry: Not on file    Food insecurity - inability: Not on file    Transportation needs - medical: Not on file   [employer] needs - non-medical: Not on file   Occupational History    Not on file   Tobacco Use    Smoking status: Never Smoker    Smokeless tobacco: Never Used   Substance and Sexual Activity    Alcohol use: Yes     Alcohol/week: 1.0 oz     Types: 2 Standard drinks or equivalent per week     Comment: occasionally    Drug use: No    Sexual activity: Yes     Partners: Male     Birth control/protection: None   Other Topics Concern    Not on file   Social History Narrative    Not on file          ROS  Per HPI    Visit Vitals  /85   Pulse 83   Temp 98 °F (36.7 °C) (Oral)   Resp 12   Ht 5' 1\" (1.549 m)   Wt 123 lb 6.4 oz (56 kg)   LMP 05/04/2010   SpO2 98%   BMI 23.32 kg/m²         Physical Exam   Physical Examination: General appearance - alert, well appearing, and in no distress  Chest - clear to auscultation, no wheezes, rales or rhonchi, symmetric air entry  Heart - normal rate, regular rhythm, normal S1, S2, no murmurs, rubs, clicks or gallops  Abdomen - soft, nontender, nondistended, no masses or organomegaly  Back exam - tenderness noted along the right piriformis, sacroiliac joints and sciatic notches nontender, negative straight-leg raise bilaterally at 45 degrees, sensory exam intact bilateral lower extremities  Neurological - alert, oriented, normal speech, no focal findings or movement disorder noted  Musculoskeletal - no joint tenderness, deformity or swelling  Extremities - peripheral pulses normal, no pedal edema, no clubbing or cyanosis      Assessment/Plan:  Diagnoses and all orders for this visit:    1.  Piriformis syndrome of right side -we will treat with stretching, ibuprofen, Flexeril. If symptoms persist would refer for physical therapy. -     cyclobenzaprine (FLEXERIL) 10 mg tablet; Take 1-2 Tabs by mouth three (3) times daily as needed for Muscle Spasm(s). -     REFERRAL TO PHYSICAL THERAPY       Follow-up Disposition:  Return if symptoms worsen or fail to improve. Advised her to call back or return to office if symptoms worsen/change/persist.  Discussed expected course/resolution/complications of diagnosis in detail with patient. Medication risks/benefits/costs/interactions/alternatives discussed with patient. She was given an after visit summary which includes diagnoses, current medications, & vitals. She expressed understanding with the diagnosis and plan.

## 2019-03-10 DIAGNOSIS — E03.9 ACQUIRED HYPOTHYROIDISM: Primary | ICD-10-CM

## 2019-03-13 LAB
CHOLEST SERPL-MCNC: 174 MG/DL (ref 100–199)
HDLC SERPL-MCNC: 71 MG/DL
LDLC SERPL CALC-MCNC: 93 MG/DL (ref 0–99)
T4 FREE SERPL-MCNC: 1.45 NG/DL (ref 0.82–1.77)
TRIGL SERPL-MCNC: 52 MG/DL (ref 0–149)
TSH SERPL DL<=0.005 MIU/L-ACNC: 0.24 UIU/ML (ref 0.45–4.5)
VLDLC SERPL CALC-MCNC: 10 MG/DL (ref 5–40)

## 2019-03-14 ENCOUNTER — OFFICE VISIT (OUTPATIENT)
Dept: INTERNAL MEDICINE CLINIC | Age: 59
End: 2019-03-14

## 2019-03-14 VITALS
SYSTOLIC BLOOD PRESSURE: 132 MMHG | HEART RATE: 73 BPM | WEIGHT: 123 LBS | RESPIRATION RATE: 14 BRPM | DIASTOLIC BLOOD PRESSURE: 76 MMHG | TEMPERATURE: 98.1 F | OXYGEN SATURATION: 99 % | BODY MASS INDEX: 23.22 KG/M2 | HEIGHT: 61 IN

## 2019-03-14 DIAGNOSIS — R03.0 TRANSIENT HYPERTENSION: Primary | ICD-10-CM

## 2019-03-14 DIAGNOSIS — F41.8 SITUATIONAL ANXIETY: ICD-10-CM

## 2019-03-14 RX ORDER — LEVOTHYROXINE SODIUM 75 UG/1
75 TABLET ORAL
Qty: 90 TAB | Refills: 3 | Status: SHIPPED | OUTPATIENT
Start: 2019-03-14 | End: 2019-07-15 | Stop reason: DRUGHIGH

## 2019-03-15 NOTE — PROGRESS NOTES
HPI:  Johanny Hwang is a 59y.o. year old female who is here for a follow-up visit. She recently had Mohs surgery on her nose and her blood pressure was elevated on arrival there. It had improved before she left but she is been concerned about it. She has had some intermittent feelings of lightheadedness. She is also had a feeling of stress in the chest as well. Of note her  announced to her in the last 2 weeks that he wanted her divorce. He then notes that he was moving to [place]. Over the last 2 weeks he has been quite variable and is moods. He has intermittently wanted her to move with him to [place] and then at times wants a divorce. He apparently has also moved around quite a bit of money and purchased a brand-new pickup truck with cash. She is concerned that she may be at risk for him to take away her medications. She has consulted with her friends who all suggested that she might need to see an . She is unaware of any unusual psychiatric history and her 's case. She does note her mother-in-law had some depression later in life. Past Medical History:   Diagnosis Date    Diverticulosis     Environmental allergies     melisa hydrate    MVP (mitral valve prolapse)     Skin cancer 06/2010     Squamous cell face    Unspecified hypothyroidism 1/31/2011       Past Surgical History:   Procedure Laterality Date    ENDOSCOPY, COLON, DIAGNOSTIC  8/16/2010    10 year fu -- Barnet Snare    HX MOHS PROCEDURES      left cheek    HX MOHS PROCEDURES      nose left sided     [place] U[place] ORAL SURG PROC BY REPORT         Prior to Admission medications    Medication Sig Start Date End Date Taking? Authorizing Provider   levothyroxine (SYNTHROID) 75 mcg tablet Take 1 Tab by mouth Daily (before breakfast). 3/14/19  Yes Ruslan Marc MD   ibuprofen (ADVIL) 200 mg tablet Take 3 Tabs by mouth every eight (8) hours as needed for Pain.  12/13/18  Yes Ruslan Marc MD   zolpidem (AMBIEN) 10 mg tablet Take 0.5-1 Tabs by mouth nightly as needed for Sleep. Max Daily Amount: 10 mg. 6/18/18  Yes Linsey Mayorga MD   cyclobenzaprine (FLEXERIL) 10 mg tablet Take 1-2 Tabs by mouth three (3) times daily as needed for Muscle Spasm(s). 12/13/18   Linsey Mayorga MD   fluticasone (FLONASE) 50 mcg/actuation nasal spray 2 Sprays by Both Nostrils route daily. 4/18/18   Linsey Mayorga MD       Social History     Socioeconomic History    Marital status:      Spouse name: Not on file    Number of children: Not on file    Years of education: Not on file    Highest education level: Not on file   Social Needs    Financial resource strain: Not on file    Food insecurity - worry: Not on file    Food insecurity - inability: Not on file    Transportation needs - medical: Not on file   Symphogen needs - non-medical: Not on file   Occupational History    Not on file   Tobacco Use    Smoking status: Never Smoker    Smokeless tobacco: Never Used   Substance and Sexual Activity    Alcohol use:  Yes     Alcohol/week: 1.0 oz     Types: 2 Standard drinks or equivalent per week     Comment: occasionally    Drug use: No    Sexual activity: Yes     Partners: Male     Birth control/protection: None   Other Topics Concern    Not on file   Social History Narrative    Not on file          ROS  Per HPI    Visit Vitals  /76   Pulse 73   Temp 98.1 °F (36.7 °C) (Oral)   Resp 14   Ht 5' 1\" (1.549 m)   Wt 123 lb (55.8 kg)   LMP 05/04/2010   SpO2 99%   BMI 23.24 kg/m²         Physical Exam   Physical Examination: General appearance - alert, well appearing, and in no distress  Chest - clear to auscultation, no wheezes, rales or rhonchi, symmetric air entry  Heart - normal rate, regular rhythm, normal S1, S2, no murmurs, rubs, clicks or gallops  Extremities - peripheral pulses normal, no pedal edema, no clubbing or cyanosis      Assessment/Plan:  Diagnoses and all orders for this visit: 1. Transient hypertension-she will monitor her blood pressure at home and provide me with readings in a month. 2. Situational anxiety-strongly suggested that she seek counseling. I gave her a list of options for counselors. Also suggested that she consider consulting with an . Other orders  -     levothyroxine (SYNTHROID) 75 mcg tablet; Take 1 Tab by mouth Daily (before breakfast). Follow-up Disposition: 1 month       Advised her to call back or return to office if symptoms worsen/change/persist.  Discussed expected course/resolution/complications of diagnosis in detail with patient. Medication risks/benefits/costs/interactions/alternatives discussed with patient. She was given an after visit summary which includes diagnoses, current medications, & vitals. She expressed understanding with the diagnosis and plan.

## 2019-04-15 ENCOUNTER — OFFICE VISIT (OUTPATIENT)
Dept: INTERNAL MEDICINE CLINIC | Age: 59
End: 2019-04-15

## 2019-04-15 VITALS
RESPIRATION RATE: 14 BRPM | TEMPERATURE: 98.4 F | DIASTOLIC BLOOD PRESSURE: 78 MMHG | HEART RATE: 75 BPM | BODY MASS INDEX: 21.9 KG/M2 | OXYGEN SATURATION: 98 % | SYSTOLIC BLOOD PRESSURE: 110 MMHG | HEIGHT: 61 IN | WEIGHT: 116 LBS

## 2019-04-15 DIAGNOSIS — E03.9 ACQUIRED HYPOTHYROIDISM: Primary | ICD-10-CM

## 2019-04-15 DIAGNOSIS — F51.01 PRIMARY INSOMNIA: ICD-10-CM

## 2019-04-15 DIAGNOSIS — F41.8 SITUATIONAL ANXIETY: ICD-10-CM

## 2019-04-15 PROBLEM — C44.311 BASAL CELL CARCINOMA OF SKIN OF NOSE: Status: ACTIVE | Noted: 2019-01-07

## 2019-04-15 NOTE — PROGRESS NOTES
HPI:  Carlos Vaca is a 59y.o. year old female who is here for a routine visit:    Resents for follow-up visit. Since she was here a month ago she has lost about 7 pounds. Her  has officially left her and she has met with a . She is going to file divorce proceedings against him today. He has apparently been in the house multiple times over the last month in and out. He has continued to spend a great deal of money. Apparently she found out he went to [place] but for unknown reasons. She is not sure where he is living here in [place]. She was concerned about her blood pressure before. Her blood pressure has been under good control. She does feel that her anxiety is still present but better since decisions have been made about filing for divorce. She is been trying to get some counseling but unable to get anyone to return her phone calls. She has 2 names that have been provided by her 's. Any suicidal ideation. Denies any depression. Past Medical History:   Diagnosis Date    Diverticulosis     Environmental allergies     melisa hydrate    MVP (mitral valve prolapse)     Skin cancer 06/2010     Squamous cell face    Unspecified hypothyroidism 1/31/2011       Past Surgical History:   Procedure Laterality Date    ENDOSCOPY, COLON, DIAGNOSTIC  8/16/2010    10 year fu -- Melvina Daniels    HX MOHS PROCEDURES      left cheek    HX MOHS PROCEDURES      nose left sided     [place] U[place] ORAL SURG PROC BY REPORT         Prior to Admission medications    Medication Sig Start Date End Date Taking? Authorizing Provider   levothyroxine (SYNTHROID) 75 mcg tablet Take 1 Tab by mouth Daily (before breakfast). 3/14/19  Yes Yazmin Ocasio MD   cyclobenzaprine (FLEXERIL) 10 mg tablet Take 1-2 Tabs by mouth three (3) times daily as needed for Muscle Spasm(s). 12/13/18  Yes Yazmin Ocasio MD   zolpidem (AMBIEN) 10 mg tablet Take 0.5-1 Tabs by mouth nightly as needed for Sleep.  Max Daily Amount: 10 mg. 6/18/18  Yes Anna Meehan MD       Social History     Socioeconomic History    Marital status:      Spouse name: Not on file    Number of children: Not on file    Years of education: Not on file    Highest education level: Not on file   Occupational History    Not on file   Social Needs    Financial resource strain: Not on file    Food insecurity:     Worry: Not on file     Inability: Not on file    Transportation needs:     Medical: Not on file     Non-medical: Not on file   Tobacco Use    Smoking status: Never Smoker    Smokeless tobacco: Never Used   Substance and Sexual Activity    Alcohol use:  Yes     Alcohol/week: 1.0 oz     Types: 2 Standard drinks or equivalent per week     Comment: occasionally    Drug use: No    Sexual activity: Yes     Partners: Male     Birth control/protection: None   Lifestyle    Physical activity:     Days per week: Not on file     Minutes per session: Not on file    Stress: Not on file   Relationships    Social connections:     Talks on phone: Not on file     Gets together: Not on file     Attends Jew service: Not on file     Active member of club or organization: Not on file     Attends meetings of clubs or organizations: Not on file     Relationship status: Not on file    Intimate partner violence:     Fear of current or ex partner: Not on file     Emotionally abused: Not on file     Physically abused: Not on file     Forced sexual activity: Not on file   Other Topics Concern    Not on file   Social History Narrative    Not on file          ROS  Per HPI    Visit Vitals  /78   Pulse 75   Temp 98.4 °F (36.9 °C) (Oral)   Resp 14   Ht 5' 1\" (1.549 m)   Wt 116 lb (52.6 kg)   LMP 05/04/2010   SpO2 98%   BMI 21.92 kg/m²         Physical Exam   Physical Examination: General appearance - alert, well appearing, and in no distress  Chest - clear to auscultation, no wheezes, rales or rhonchi, symmetric air entry  Heart - normal rate, regular rhythm, normal S1, S2, no murmurs, rubs, clicks or gallops  Extremities - peripheral pulses normal, no pedal edema, no clubbing or cyanosis      Assessment/Plan:  Diagnoses and all orders for this visit:    1. Acquired hypothyroidism-euthyroid by recent labs. We will continue her thyroid replacement. 2. Situational anxiety-discussed counseling. She will continue to seek out a counselor. Do not feel that she needs medication at this point. If she becomes more stressed or anxious or depressed she will let me know. 3. Primary insomnia-she has Ambien to use as needed. She has not been taking it as she is been concerned that he might show up at night and create a problem. She will use this as time moves forward to deal with the stress. Advised her to call back or return to office if symptoms worsen/change/persist.  Discussed expected course/resolution/complications of diagnosis in detail with patient. Medication risks/benefits/costs/interactions/alternatives discussed with patient. She was given an after visit summary which includes diagnoses, current medications, & vitals. She expressed understanding with the diagnosis and plan.

## 2019-07-02 DIAGNOSIS — F51.01 PRIMARY INSOMNIA: ICD-10-CM

## 2019-07-02 RX ORDER — ZOLPIDEM TARTRATE 10 MG/1
TABLET ORAL
Qty: 30 TAB | Refills: 0 | OUTPATIENT
Start: 2019-07-02 | End: 2020-03-18 | Stop reason: SDUPTHER

## 2019-07-02 NOTE — TELEPHONE ENCOUNTER
Orders Placed This Encounter    zolpidem (AMBIEN) 10 mg tablet     Sig: TAKE ONE-HALF TO ONE WHOLE TABLET BY MOUTH EVERY NIGHT AT BEDTIME AS NEEDED     Dispense:  30 Tab     Refill:  0     The above controlled substance refill was called into patients pharmacy - [facility] - authorized by Dr. Bijal Hughes.

## 2019-07-10 DIAGNOSIS — E03.9 ACQUIRED HYPOTHYROIDISM: Primary | ICD-10-CM

## 2019-07-11 LAB
ALBUMIN SERPL-MCNC: 4.5 G/DL (ref 3.6–4.8)
ALBUMIN/GLOB SERPL: 1.6 {RATIO} (ref 1.2–2.2)
ALP SERPL-CCNC: 57 IU/L (ref 39–117)
ALT SERPL-CCNC: 16 IU/L (ref 0–32)
AST SERPL-CCNC: 18 IU/L (ref 0–40)
BASOPHILS # BLD AUTO: 0 X10E3/UL (ref 0–0.2)
BASOPHILS NFR BLD AUTO: 0 %
BILIRUB SERPL-MCNC: 0.4 MG/DL (ref 0–1.2)
BUN SERPL-MCNC: 12 MG/DL (ref 8–27)
BUN/CREAT SERPL: 14 (ref 12–28)
CALCIUM SERPL-MCNC: 9.5 MG/DL (ref 8.7–10.3)
CHLORIDE SERPL-SCNC: 103 MMOL/L (ref 96–106)
CO2 SERPL-SCNC: 28 MMOL/L (ref 20–29)
CREAT SERPL-MCNC: 0.87 MG/DL (ref 0.57–1)
EOSINOPHIL # BLD AUTO: 0 X10E3/UL (ref 0–0.4)
EOSINOPHIL NFR BLD AUTO: 0 %
ERYTHROCYTE [DISTWIDTH] IN BLOOD BY AUTOMATED COUNT: 13.4 % (ref 12.3–15.4)
GLOBULIN SER CALC-MCNC: 2.8 G/DL (ref 1.5–4.5)
GLUCOSE SERPL-MCNC: 85 MG/DL (ref 65–99)
HCT VFR BLD AUTO: 36.6 % (ref 34–46.6)
HGB BLD-MCNC: 12.8 G/DL (ref 11.1–15.9)
IMM GRANULOCYTES # BLD AUTO: 0 X10E3/UL (ref 0–0.1)
IMM GRANULOCYTES NFR BLD AUTO: 0 %
LYMPHOCYTES # BLD AUTO: 2.3 X10E3/UL (ref 0.7–3.1)
LYMPHOCYTES NFR BLD AUTO: 30 %
MCH RBC QN AUTO: 30.4 PG (ref 26.6–33)
MCHC RBC AUTO-ENTMCNC: 35 G/DL (ref 31.5–35.7)
MCV RBC AUTO: 87 FL (ref 79–97)
MONOCYTES # BLD AUTO: 0.5 X10E3/UL (ref 0.1–0.9)
MONOCYTES NFR BLD AUTO: 7 %
NEUTROPHILS # BLD AUTO: 4.6 X10E3/UL (ref 1.4–7)
NEUTROPHILS NFR BLD AUTO: 63 %
PLATELET # BLD AUTO: 249 X10E3/UL (ref 150–450)
POTASSIUM SERPL-SCNC: 4.6 MMOL/L (ref 3.5–5.2)
PROT SERPL-MCNC: 7.3 G/DL (ref 6–8.5)
RBC # BLD AUTO: 4.21 X10E6/UL (ref 3.77–5.28)
SODIUM SERPL-SCNC: 143 MMOL/L (ref 134–144)
T4 FREE SERPL-MCNC: 1.65 NG/DL (ref 0.82–1.77)
TSH SERPL DL<=0.005 MIU/L-ACNC: 0.06 UIU/ML (ref 0.45–4.5)
WBC # BLD AUTO: 7.5 X10E3/UL (ref 3.4–10.8)

## 2019-07-15 ENCOUNTER — TELEPHONE (OUTPATIENT)
Dept: INTERNAL MEDICINE CLINIC | Age: 59
End: 2019-07-15

## 2019-07-15 RX ORDER — LEVOTHYROXINE SODIUM 50 UG/1
50 TABLET ORAL
Qty: 90 TAB | Refills: 0 | Status: SHIPPED | OUTPATIENT
Start: 2019-07-15 | End: 2019-09-25 | Stop reason: SDUPTHER

## 2019-07-15 NOTE — TELEPHONE ENCOUNTER
----- Message from Nain Lopez MD sent at 7/11/2019  5:28 PM EDT -----  Reduce the synthroid to 50 mcg. Labs in 3 months for that only.

## 2019-07-15 NOTE — TELEPHONE ENCOUNTER
Verified patient identity with two identifiers. Spoke with patient by phone c/o intermittent palpitations (not occurring daily), she has seen Dr. Oscar Berrios for this before. Lab work for thyroid was recently completed. Denies both CP and SOB. Appt for tomorrow made to see Dr. Oscar Berrios, patient did not want to see another provider today. Red flags to warrant ER or earlier clinical evaluation reviewed. Patient verbalized understanding.

## 2019-07-15 NOTE — TELEPHONE ENCOUNTER
Spoke with pt in ref to lab results. TSH not at goal. Per [facility], will decrease levothyroxine to 50 mcg daily and repeat labs in 3 months. Rx sent to pharm. All other labs stable or at goal. Verbalized understanding. Orders Placed This Encounter    levothyroxine (SYNTHROID) 50 mcg tablet     Sig: Take 1 Tab by mouth Daily (before breakfast). Dispense:  90 Tab     Refill:  0     The above orders were approved via [facility] per Dr. Isidoro Goldstein, III.

## 2019-07-23 ENCOUNTER — OFFICE VISIT (OUTPATIENT)
Dept: INTERNAL MEDICINE CLINIC | Age: 59
End: 2019-07-23

## 2019-07-23 VITALS
BODY MASS INDEX: 20.2 KG/M2 | HEIGHT: 61 IN | WEIGHT: 107 LBS | DIASTOLIC BLOOD PRESSURE: 69 MMHG | TEMPERATURE: 98.5 F | SYSTOLIC BLOOD PRESSURE: 110 MMHG | HEART RATE: 60 BPM | RESPIRATION RATE: 16 BRPM | OXYGEN SATURATION: 100 %

## 2019-07-23 DIAGNOSIS — R00.2 PALPITATIONS: Primary | ICD-10-CM

## 2019-07-23 DIAGNOSIS — F41.8 SITUATIONAL ANXIETY: ICD-10-CM

## 2019-07-23 DIAGNOSIS — E03.9 ACQUIRED HYPOTHYROIDISM: ICD-10-CM

## 2019-07-23 DIAGNOSIS — F51.01 PRIMARY INSOMNIA: ICD-10-CM
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
## 2019-07-29 RX ORDER — SERTRALINE HYDROCHLORIDE 25 MG/1
TABLET, FILM COATED ORAL
Qty: 30 TAB | Refills: 1 | Status: SHIPPED | OUTPATIENT
Start: 2019-07-29 | End: 2019-09-26 | Stop reason: SDUPTHER

## 2019-08-01 ENCOUNTER — TELEPHONE (OUTPATIENT)
Dept: INTERNAL MEDICINE CLINIC | Age: 59
End: 2019-08-01

## 2019-08-01 NOTE — TELEPHONE ENCOUNTER
Lesa Bates (Self) [phone]     Pt says that she took a double dose of her levothyroxine meds this morning,  Check to see if this ok?

## 2019-08-01 NOTE — TELEPHONE ENCOUNTER
Spoke with pt and advised for her to skip her dose of levothyroxine tomorrow. She is not experiencing any side effects from the double dose. Red flags reviewed. Verbalized understanding.

## 2019-09-26 RX ORDER — LEVOTHYROXINE SODIUM 50 UG/1
50 TABLET ORAL
Qty: 90 TAB | Refills: 1 | Status: SHIPPED | OUTPATIENT
Start: 2019-09-26 | End: 2020-03-18 | Stop reason: SDUPTHER

## 2019-09-26 RX ORDER — SERTRALINE HYDROCHLORIDE 25 MG/1
25 TABLET, FILM COATED ORAL DAILY
Qty: 90 TAB | Refills: 1 | Status: SHIPPED | OUTPATIENT
Start: 2019-09-26 | End: 2020-01-24 | Stop reason: DRUGHIGH

## 2019-09-26 NOTE — TELEPHONE ENCOUNTER
Orders Placed This Encounter    levothyroxine (SYNTHROID) 50 mcg tablet     Sig: Take 1 Tab by mouth Daily (before breakfast). Dispense:  90 Tab     Refill:  1    sertraline (ZOLOFT) 25 mg tablet     Sig: Take 1 Tab by mouth daily. Dispense:  90 Tab     Refill:  1     The above orders were approved via [facility] per Dr. Meenakshi Arias, III.

## 2019-12-12 ENCOUNTER — PATIENT MESSAGE (OUTPATIENT)
Dept: INTERNAL MEDICINE CLINIC | Age: 59
End: 2019-12-12

## 2019-12-12 DIAGNOSIS — E03.9 ACQUIRED HYPOTHYROIDISM: Primary | ICD-10-CM

## 2019-12-13 NOTE — TELEPHONE ENCOUNTER
From: Carrie Enciso  To: Cristy Swenson MD  Sent: 12/12/2019 9:57 PM EST  Subject: Visit Tenzin Hernandes,    I think I am supposed to get my TSH checked, since my dosage changed to 50 mcg. Could you please put a labslip up front for me to accomplish this? Thank you very much.     Won Burks

## 2019-12-17 LAB
T4 FREE SERPL-MCNC: 1.04 NG/DL (ref 0.82–1.77)
TSH SERPL DL<=0.005 MIU/L-ACNC: 1.87 UIU/ML (ref 0.45–4.5)

## 2020-01-24 ENCOUNTER — OFFICE VISIT (OUTPATIENT)
Dept: INTERNAL MEDICINE CLINIC | Age: 60
End: 2020-01-24

## 2020-01-24 VITALS
WEIGHT: 110 LBS | OXYGEN SATURATION: 100 % | HEIGHT: 61 IN | RESPIRATION RATE: 14 BRPM | DIASTOLIC BLOOD PRESSURE: 67 MMHG | HEART RATE: 68 BPM | TEMPERATURE: 98.1 F | BODY MASS INDEX: 20.77 KG/M2 | SYSTOLIC BLOOD PRESSURE: 115 MMHG

## 2020-01-24 DIAGNOSIS — E03.9 ACQUIRED HYPOTHYROIDISM: ICD-10-CM

## 2020-01-24 DIAGNOSIS — F41.1 GAD (GENERALIZED ANXIETY DISORDER): Primary | ICD-10-CM

## 2020-01-24 DIAGNOSIS — F51.01 PRIMARY INSOMNIA: ICD-10-CM

## 2020-01-24 RX ORDER — SERTRALINE HYDROCHLORIDE 50 MG/1
50 TABLET, FILM COATED ORAL DAILY
Qty: 30 TAB | Refills: 3 | Status: SHIPPED | OUTPATIENT
Start: 2020-01-24 | End: 2020-03-18 | Stop reason: SDUPTHER
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
## 2020-03-11 ENCOUNTER — PATIENT MESSAGE (OUTPATIENT)
Dept: INTERNAL MEDICINE CLINIC | Age: 60
End: 2020-03-11

## 2020-03-11 DIAGNOSIS — G57.01 PIRIFORMIS SYNDROME OF RIGHT SIDE: ICD-10-CM

## 2020-03-11 RX ORDER — CYCLOBENZAPRINE HCL 10 MG
10-20 TABLET ORAL
Qty: 60 TAB | Refills: 1 | Status: SHIPPED | OUTPATIENT
Start: 2020-03-11 | End: 2021-08-27 | Stop reason: ALTCHOICE

## 2020-03-18 DIAGNOSIS — F51.01 PRIMARY INSOMNIA: ICD-10-CM

## 2020-03-18 RX ORDER — ZOLPIDEM TARTRATE 10 MG/1
10 TABLET ORAL
Qty: 30 TAB | Refills: 2 | Status: SHIPPED | OUTPATIENT
Start: 2020-03-18 | End: 2020-08-21 | Stop reason: SDUPTHER

## 2020-03-18 RX ORDER — LEVOTHYROXINE SODIUM 50 UG/1
50 TABLET ORAL
Qty: 90 TAB | Refills: 1 | Status: SHIPPED | OUTPATIENT
Start: 2020-03-18 | End: 2020-08-21 | Stop reason: SDUPTHER

## 2020-03-18 RX ORDER — SERTRALINE HYDROCHLORIDE 50 MG/1
50 TABLET, FILM COATED ORAL DAILY
Qty: 90 TAB | Refills: 1 | Status: SHIPPED | OUTPATIENT
Start: 2020-03-18 | End: 2020-08-21 | Stop reason: SDUPTHER

## 2020-06-12 DIAGNOSIS — E03.9 ACQUIRED HYPOTHYROIDISM: Primary | ICD-10-CM

## 2020-06-16 ENCOUNTER — TELEPHONE (OUTPATIENT)
Dept: INTERNAL MEDICINE CLINIC | Age: 60
End: 2020-06-16

## 2020-06-17 LAB
T4 FREE SERPL-MCNC: 1.18 NG/DL (ref 0.82–1.77)
TSH SERPL DL<=0.005 MIU/L-ACNC: 2.38 UIU/ML (ref 0.45–4.5)

## 2020-08-21 DIAGNOSIS — F51.01 PRIMARY INSOMNIA: ICD-10-CM

## 2020-08-21 RX ORDER — LEVOTHYROXINE SODIUM 50 UG/1
50 TABLET ORAL
Qty: 90 TAB | Refills: 1 | Status: SHIPPED | OUTPATIENT
Start: 2020-08-21 | End: 2021-03-29 | Stop reason: DRUGHIGH

## 2020-08-21 RX ORDER — ZOLPIDEM TARTRATE 10 MG/1
10 TABLET ORAL
Qty: 30 TAB | Refills: 2 | Status: SHIPPED | OUTPATIENT
Start: 2020-08-21 | End: 2021-12-05 | Stop reason: SDUPTHER

## 2020-08-21 RX ORDER — SERTRALINE HYDROCHLORIDE 50 MG/1
50 TABLET, FILM COATED ORAL DAILY
Qty: 90 TAB | Refills: 1 | Status: SHIPPED | OUTPATIENT
Start: 2020-08-21 | End: 2020-11-05 | Stop reason: DRUGHIGH

## 2020-11-04 ENCOUNTER — PATIENT MESSAGE (OUTPATIENT)
Dept: INTERNAL MEDICINE CLINIC | Age: 60
End: 2020-11-04

## 2020-11-05 RX ORDER — SERTRALINE HYDROCHLORIDE 50 MG/1
75 TABLET, FILM COATED ORAL DAILY
Qty: 145 TAB | Refills: 1 | Status: SHIPPED | OUTPATIENT
Start: 2020-11-05 | End: 2021-06-03 | Stop reason: SDUPTHER

## 2020-11-18 ENCOUNTER — PATIENT MESSAGE (OUTPATIENT)
Dept: INTERNAL MEDICINE CLINIC | Age: 60
End: 2020-11-18

## 2021-03-25 DIAGNOSIS — E03.9 ACQUIRED HYPOTHYROIDISM: Primary | ICD-10-CM

## 2021-03-29 ENCOUNTER — TELEPHONE (OUTPATIENT)
Dept: INTERNAL MEDICINE CLINIC | Age: 61
End: 2021-03-29

## 2021-03-29 DIAGNOSIS — E03.9 ACQUIRED HYPOTHYROIDISM: Primary | ICD-10-CM

## 2021-03-29 RX ORDER — LEVOTHYROXINE SODIUM 75 UG/1
75 TABLET ORAL
Qty: 90 TAB | Refills: 3 | Status: SHIPPED | OUTPATIENT
Start: 2021-03-29 | End: 2022-03-07

## 2021-03-29 NOTE — TELEPHONE ENCOUNTER
Spoke with patient. Advised per Dr. Paulina Pride thyroid  levels not at goal.  Need to increase synthroid to 75 mcg  New rx sent to local pharmacy per patients request.  Repeat labs in 6 months. Patient verbalized understanding. Orders Placed This Encounter    levothyroxine (SYNTHROID) 75 mcg tablet     Sig: Take 1 Tab by mouth Daily (before breakfast). Dispense:  90 Tab     Refill:  3     The above orders were approved via [facility] per Dr. Carle Kayser, III.

## 2021-03-29 NOTE — TELEPHONE ENCOUNTER
----- Message from Nathaniel Rodriguez MD sent at 3/26/2021  8:41 PM EDT -----  Increase synthroid to 75. Labs in 6 months.

## 2021-06-03 ENCOUNTER — PATIENT MESSAGE (OUTPATIENT)
Dept: INTERNAL MEDICINE CLINIC | Age: 61
End: 2021-06-03

## 2021-06-03 RX ORDER — SERTRALINE HYDROCHLORIDE 50 MG/1
50 TABLET, FILM COATED ORAL DAILY
Qty: 90 TABLET | Refills: 1 | Status: SHIPPED | OUTPATIENT
Start: 2021-06-03 | End: 2021-07-23

## 2021-07-23 ENCOUNTER — OFFICE VISIT (OUTPATIENT)
Dept: INTERNAL MEDICINE CLINIC | Age: 61
End: 2021-07-23
Payer: OTHER GOVERNMENT

## 2021-07-23 VITALS
RESPIRATION RATE: 16 BRPM | DIASTOLIC BLOOD PRESSURE: 80 MMHG | HEIGHT: 61 IN | BODY MASS INDEX: 27.56 KG/M2 | WEIGHT: 146 LBS | TEMPERATURE: 96.3 F | SYSTOLIC BLOOD PRESSURE: 128 MMHG | HEART RATE: 75 BPM | OXYGEN SATURATION: 97 %

## 2021-07-23 DIAGNOSIS — E03.9 ACQUIRED HYPOTHYROIDISM: Primary | ICD-10-CM

## 2021-07-23 DIAGNOSIS — F41.1 GAD (GENERALIZED ANXIETY DISORDER): ICD-10-CM

## 2021-07-23 PROCEDURE — 99213 OFFICE O/P EST LOW 20 MIN: CPT | Performed by: INTERNAL MEDICINE

## 2021-07-23 RX ORDER — SERTRALINE HYDROCHLORIDE 50 MG/1
75 TABLET, FILM COATED ORAL DAILY
Qty: 135 TABLET | Refills: 1 | COMMUNITY
Start: 2021-07-23 | End: 2021-12-05 | Stop reason: SDUPTHER
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
## 2021-08-27 ENCOUNTER — OFFICE VISIT (OUTPATIENT)
Dept: INTERNAL MEDICINE CLINIC | Age: 61
End: 2021-08-27
Payer: OTHER GOVERNMENT

## 2021-08-27 VITALS
WEIGHT: 148 LBS | BODY MASS INDEX: 27.94 KG/M2 | OXYGEN SATURATION: 97 % | RESPIRATION RATE: 16 BRPM | TEMPERATURE: 98 F | HEIGHT: 61 IN | HEART RATE: 69 BPM | SYSTOLIC BLOOD PRESSURE: 128 MMHG | DIASTOLIC BLOOD PRESSURE: 79 MMHG

## 2021-08-27 DIAGNOSIS — M79.89 AXILLARY SWELLING: Primary | ICD-10-CM

## 2021-08-27 DIAGNOSIS — M54.2 NECK PAIN: ICD-10-CM

## 2021-08-27 DIAGNOSIS — E03.9 ACQUIRED HYPOTHYROIDISM: ICD-10-CM

## 2021-08-27 DIAGNOSIS — F41.1 GAD (GENERALIZED ANXIETY DISORDER): ICD-10-CM

## 2021-08-27 DIAGNOSIS — E78.2 MIXED HYPERLIPIDEMIA: ICD-10-CM

## 2021-08-27 PROCEDURE — 99214 OFFICE O/P EST MOD 30 MIN: CPT | Performed by: INTERNAL MEDICINE

## 2021-08-27 NOTE — PROGRESS NOTES
HPI:  Daniela Castaneda is a 56y.o. year old female who is here for several issues. Over the last week she is noted some swelling under her arms. More so under the left arm in the axilla. She noticed it when she was showering. No pain there. No fevers or chills. No night sweats. She did get her vaccine in her left arm but that is been months ago. She recently had a mammogram and it was normal.  No changes in her breast.  No other swelling noted. She does feel that her anxiety is under reasonable control and she is only using the sertraline as needed. She also wanted to discuss her cholesterol as it was elevated on her most recent readings. She does have 2 brothers with a history of heart disease in their 62s. Past Medical History:   Diagnosis Date    Diverticulosis     Environmental allergies     melisa hydrate    MVP (mitral valve prolapse)     Skin cancer 06/2010     Squamous cell face    Unspecified hypothyroidism 1/31/2011       Past Surgical History:   Procedure Laterality Date    ENDOSCOPY, COLON, DIAGNOSTIC  8/16/2010    10 year fu -- Whit Noland    HX MOHS PROCEDURES      left cheek    HX MOHS PROCEDURES      nose left sided     MO UNS ORAL SURG PROC BY REPORT         Prior to Admission medications    Medication Sig Start Date End Date Taking? Authorizing Provider   sertraline (ZOLOFT) 50 mg tablet Take 1.5 Tablets by mouth daily. 7/23/21  Yes Aleksey Mcfarlane MD   levothyroxine (SYNTHROID) 75 mcg tablet Take 1 Tab by mouth Daily (before breakfast). 3/29/21  Yes Aleksey Mcfarlane MD   zolpidem (AMBIEN) 10 mg tablet Take 1 Tab by mouth nightly as needed for Sleep. Max Daily Amount: 10 mg. 8/21/20  Yes Aleksey Mcfarlane MD   cyclobenzaprine (FLEXERIL) 10 mg tablet Take 1-2 Tabs by mouth three (3) times daily as needed for Muscle Spasm(s).   Patient not taking: Reported on 7/23/2021 3/11/20 8/27/21  Aleksey Mcfarlane MD       Social History     Socioeconomic History    Marital status:      Spouse name: Not on file    Number of children: Not on file    Years of education: Not on file    Highest education level: Not on file   Occupational History    Not on file   Tobacco Use    Smoking status: Never Smoker    Smokeless tobacco: Never Used   Substance and Sexual Activity    Alcohol use: Yes     Alcohol/week: 1.7 standard drinks     Types: 2 Standard drinks or equivalent per week     Comment: occasionally    Drug use: No    Sexual activity: Not Currently     Partners: Male     Birth control/protection: None   Other Topics Concern    Not on file   Social History Narrative    Not on file     Social Determinants of Health     Financial Resource Strain:     Difficulty of Paying Living Expenses:    Food Insecurity:     Worried About Running Out of Food in the Last Year:     Ran Out of Food in the Last Year:    Transportation Needs:     Lack of Transportation (Medical):      Lack of Transportation (Non-Medical):    Physical Activity:     Days of Exercise per Week:     Minutes of Exercise per Session:    Stress:     Feeling of Stress :    Social Connections:     Frequency of Communication with Friends and Family:     Frequency of Social Gatherings with Friends and Family:     Attends Hindu Services:     Active Member of Clubs or Organizations:     Attends Club or Organization Meetings:     Marital Status:    Intimate Partner Violence:     Fear of Current or Ex-Partner:     Emotionally Abused:     Physically Abused:     Sexually Abused:           ROS  Per HPI    Visit Vitals  /79   Pulse 69   Temp 98 °F (36.7 °C) (Temporal)   Resp 16   Ht 5' 1\" (1.549 m)   Wt 148 lb (67.1 kg)   LMP 05/04/2010   SpO2 97%   BMI 27.96 kg/m²         Physical Exam   Physical Examination: General appearance - alert, well appearing, and in no distress  Mouth - mucous membranes moist, pharynx normal without lesions  Neck - supple, no significant adenopathy, thyroid exam: thyroid is normal in size without nodules or tenderness  Lymphatics - no hepatosplenomegaly, there is palpable what appears to be nodes in the left axilla. No tenderness or redness. No fluctuance. Is some slight firmness in the right axilla as well. Chest - clear to auscultation, no wheezes, rales or rhonchi, symmetric air entry  Heart - normal rate, regular rhythm, normal S1, S2, no murmurs, rubs, clicks or gallops  Abdomen - soft, nontender, nondistended, no masses or organomegaly      Assessment/Plan:  Diagnoses and all orders for this visit:    1. Axillary swelling -? reactive nodes. Will obtain a soft tissue ultrasound to evaluate and consider further evaluation.  -     US THYROID/PARATHYROID/SOFT TISS; Future    2. Acquired hypothyroidism euthyroid clinically and by recent labs. 3. STEPH (generalized anxiety disorder)advised she should take her sertraline on a daily basis to see if it helps control her symptoms overall. 4. Neck painstable with ibuprofen. 5. Mixed hyperlipidemiadiscussed her current levels. Her previous LDL was under great control. We will have her work on diet and exercise for 6 months and then repeat levels. If not improved, consider statins. Advised her to call back or return to office if symptoms worsen/change/persist.  Discussed expected course/resolution/complications of diagnosis in detail with patient. Medication risks/benefits/costs/interactions/alternatives discussed with patient. She was given an after visit summary which includes diagnoses, current medications, & vitals. She expressed understanding with the diagnosis and plan.

## 2021-09-02 ENCOUNTER — TELEPHONE (OUTPATIENT)
Dept: INTERNAL MEDICINE CLINIC | Age: 61
End: 2021-09-02

## 2021-09-02 NOTE — TELEPHONE ENCOUNTER
bandar with at [facility] [phone]     Says pt has appt tomorrow and they have a question regarding order for this.

## 2021-09-03 ENCOUNTER — HOSPITAL ENCOUNTER (OUTPATIENT)
Dept: ULTRASOUND IMAGING | Age: 61
Discharge: HOME | End: 2021-09-03
Attending: INTERNAL MEDICINE
Payer: OTHER GOVERNMENT

## 2021-09-03 DIAGNOSIS — M79.89 AXILLARY SWELLING: ICD-10-CM

## 2021-09-03 PROCEDURE — 76882 US LMTD JT/FCL EVL NVASC XTR: CPT

## 2021-09-09 ENCOUNTER — TELEPHONE (OUTPATIENT)
Dept: INTERNAL MEDICINE CLINIC | Age: 61
End: 2021-09-09

## 2021-09-09 DIAGNOSIS — R59.9 LYMPH NODE ENLARGEMENT: ICD-10-CM

## 2021-09-09 DIAGNOSIS — M79.89 AXILLARY SWELLING: Primary | ICD-10-CM

## 2021-09-09 NOTE — TELEPHONE ENCOUNTER
Spoke with patient and advised per Dr. Jerome Gordon small bilateral lymph nodes of the axilla. Recommends follow up with breast surgeon to discuss removing and biopsy. Referral, office note, and ultrasound results faxed to Dr. Estrella Medeiros office - [facility] location @ Fax # [phone]. Info provided to patient as well to follow up on appointment status. She verbalized understanding.

## 2021-09-09 NOTE — TELEPHONE ENCOUNTER
----- Message from Matt Price MD sent at 9/7/2021  8:36 PM EDT -----  Notify small lymph nodes. Have her see a breast surgeon. Itzel Borges is good.

## 2021-09-17 ENCOUNTER — DOCUMENTATION ONLY (OUTPATIENT)
Dept: SURGERY | Age: 61
End: 2021-09-17

## 2021-09-17 NOTE — PROGRESS NOTES
Patient came in [email or web address] office and dropped off CD & Reports from [facility]. Will put in Nurse desk @[facility].

## 2021-09-22 ENCOUNTER — DOCUMENTATION ONLY (OUTPATIENT)
Dept: SURGERY | Age: 61
End: 2021-09-22

## 2021-09-22 ENCOUNTER — OFFICE VISIT (OUTPATIENT)
Dept: SURGERY | Age: 61
End: 2021-09-22
Payer: OTHER GOVERNMENT

## 2021-09-22 VITALS
BODY MASS INDEX: 27.94 KG/M2 | SYSTOLIC BLOOD PRESSURE: 127 MMHG | DIASTOLIC BLOOD PRESSURE: 52 MMHG | HEART RATE: 63 BPM | WEIGHT: 148 LBS | HEIGHT: 61 IN

## 2021-09-22 DIAGNOSIS — Q83.8 ECTOPIC BREAST TISSUE: Primary | ICD-10-CM

## 2021-09-22 PROCEDURE — 76642 ULTRASOUND BREAST LIMITED: CPT | Performed by: SURGERY

## 2021-09-22 PROCEDURE — 99243 OFF/OP CNSLTJ NEW/EST LOW 30: CPT | Performed by: SURGERY

## 2021-09-22 RX ORDER — IBUPROFEN 200 MG
200 TABLET ORAL
COMMUNITY

## 2021-09-22 NOTE — LETTER
9/28/2021 10:44 AM    Patient:  Harriett Gutierrez   YOB: 1960  Date of Visit: 9/22/2021      Dear Dr. Therese Schultz: Thank you for referring Ms. Ambrose Guido to me for evaluation/treatment. Below are the relevant portions of my assessment and plan of care. If you have questions, please do not hesitate to call me. I look forward to following Ms. Marisol Hobson along with you.         Sincerely,      Goldy Alamo MD

## 2021-09-22 NOTE — PROGRESS NOTES
HISTORY OF PRESENT ILLNESS  Yi Vanegas is a 56 y.o. female. HPI  NEW Patient presents for consultation at the request of Dr. Sarah Moss for bilateral axillary swelling, LEFT moreso than RIGHT. She denies any pain associated with this. Pt first noticed this 8/21/21. Saw her PCP Dr. Sarah Moss who ordered breast/axillary US and referred her here. Pt thinks swelling \"may have gone down a bit,\" but isn't sure. She also notes approx. 20 lb weight gain over the last 1.5 years. Reyes Meiers covid vaccine # 1 on 3/22/21 and # 2 on 4/19/21, in the LEFT arm.     No family history of breast or ovarian cancer. Breast imaging-  5/27/21 - mammogram at [facility]: BI-RADS 1  9/3/21 - bilateral breast US:  [address] [facility] Results (most recent):  Results from Hospital Encounter encounter on 09/03/21     US EXT NONVAS RT LTD     Narrative  EXAM: US EXT NONVAS RT LTD, US EXT NONVAS LT LTD     INDICATION: Axillary swelling     COMPARISON: None.     TECHNIQUE: Targeted ultrasound of the bilateral axilla using grayscale and color  technique     FINDINGS:  Right axilla:  Approximately 1.7 cm x 0.8 cm ovoid hypoechoic focus with thin hyperechoic rim,  may reflect a lymph node.     Left axilla: An approximately 1.4 cm x 1.3 cm x 0.9 cm ovoid hypoechoic focus with thin  hyperechoic rim and central hyper echogenicity and internal vascularity, favors  a lymph node.     Impression  1. Likely left axillary lymph node. 2.  Probable right axillary lymph node.       Past Medical History:   Diagnosis Date    Diverticulosis     Environmental allergies     melisa hydrate    Hypercholesterolemia     MVP (mitral valve prolapse)     Skin cancer 06/2010     Squamous cell face    Unspecified hypothyroidism 1/31/2011       Past Surgical History:   Procedure Laterality Date    ENDOSCOPY, COLON, DIAGNOSTIC  8/16/2010    10 year fu -- Aisha Robles    HX MOHS PROCEDURES      left cheek    HX MOHS PROCEDURES      nose left sided     [place] UNS ORAL SURG PROC BY REPORT Social History     Socioeconomic History    Marital status:      Spouse name: Not on file    Number of children: Not on file    Years of education: Not on file    Highest education level: Not on file   Occupational History    Not on file   Tobacco Use    Smoking status: Never Smoker    Smokeless tobacco: Never Used   Substance and Sexual Activity    Alcohol use: Yes     Alcohol/week: 1.7 standard drinks     Types: 2 Standard drinks or equivalent per week     Comment: occasionally    Drug use: No    Sexual activity: Not Currently     Partners: Male     Birth control/protection: None   Other Topics Concern    Not on file   Social History Narrative    Not on file     Social Determinants of Health     Financial Resource Strain:     Difficulty of Paying Living Expenses:    Food Insecurity:     Worried About Running Out of Food in the Last Year:     Ran Out of Food in the Last Year:    Transportation Needs:     Lack of Transportation (Medical):  Lack of Transportation (Non-Medical):    Physical Activity:     Days of Exercise per Week:     Minutes of Exercise per Session:    Stress:     Feeling of Stress :    Social Connections:     Frequency of Communication with Friends and Family:     Frequency of Social Gatherings with Friends and Family:     Attends Yarsanism Services:     Active Member of Clubs or Organizations:     Attends Club or Organization Meetings:     Marital Status:    Intimate Partner Violence:     Fear of Current or Ex-Partner:     Emotionally Abused:     Physically Abused:     Sexually Abused:        Current Outpatient Medications on File Prior to Visit   Medication Sig Dispense Refill    ibuprofen (AdviL) 200 mg tablet Take 200 mg by mouth.  sertraline (ZOLOFT) 50 mg tablet Take 1.5 Tablets by mouth daily. 135 Tablet 1    levothyroxine (SYNTHROID) 75 mcg tablet Take 1 Tab by mouth Daily (before breakfast).  90 Tab 3    zolpidem (AMBIEN) 10 mg tablet Take 1 Tab by mouth nightly as needed for Sleep. Max Daily Amount: 10 mg. (Patient taking differently: Take 5 mg by mouth nightly as needed for Sleep.) 30 Tab 2     No current facility-administered medications on file prior to visit. Allergies   Allergen Reactions    Terpin Hydrate Swelling     Throat swelling         OB History    No obstetric history on file. Obstetric Comments   Menarche:  [age]. LMP: ? .  # of Children:  1 adopted child. Age at Delivery of First Child:  n/a. Hysterectomy/oophorectomy:  ?/NO. Breast Bx:  no.  Hx of Breast Feeding:  no. BCP:  yes. Hormone therapy:  no.                  Review of Systems   Constitutional: Negative. HENT: Negative. Eyes: Negative. Respiratory: Negative. Cardiovascular: Negative. Gastrointestinal: Negative. Genitourinary: Negative. Musculoskeletal: Negative. Skin: Negative. Neurological: Negative. Endo/Heme/Allergies: Negative. Psychiatric/Behavioral: Negative. Physical Exam  Exam conducted with a chaperone present. Cardiovascular:      Rate and Rhythm: Normal rate and regular rhythm. Heart sounds: Normal heart sounds. Pulmonary:      Breath sounds: Normal breath sounds. Chest:      Breasts: Breasts are symmetrical.         Right: Normal. No swelling, bleeding, inverted nipple, mass, nipple discharge, skin change or tenderness. Left: Normal. No swelling, bleeding, inverted nipple, mass, nipple discharge, skin change or tenderness. Lymphadenopathy:      Cervical:      Right cervical: No superficial, deep or posterior cervical adenopathy. Left cervical: No superficial, deep or posterior cervical adenopathy. Upper Body:      Right upper body: No supraclavicular or axillary adenopathy. Left upper body: No supraclavicular or axillary adenopathy. BREAST ULTRASOUND  Indication: BILATERAL ectopic breast tissue, probable LNs on previous US  Technique:  The area was scanned using a high-frequency linear-array near-field transducer  Findings: 0.96 x 1.09 x 0.65cm LN on the LEFT, and 1.29 x 0.59 x 1.5cm LN on the RIGHT  Impression: Benign lymph nodes and ectopic breast tissue, no cysts identified  Disposition: No intervention required      ASSESSMENT and PLAN    ICD-10-CM ICD-9-CM    1. Ectopic breast tissue  Q83.8 757.6       New patient presents for evaluation of BL axillary swelling, and is doing well overall. Ectopic breast tissue noted on BL axillary exam, which may be related to recent weight gain. BL axillary US visualizes 0.96 x 1.09 x 0.65cm LN on the LEFT, and 1.29 x 0.59 x 1.5cm LN on the RIGHT. Both LNs appear normal, are smaller than previous measurements, and are unrelated to ectopic axillary breast tissue. No worrisome findings on exam or US. Pt to continue annual mammography. F/U PRN. This plan was reviewed with the patient and patient agrees. All questions were answered. Total time spent was 40 minutes.     Written by Elza Meadows, as dictated by Dr. Derek Santiago MD.

## 2021-09-22 NOTE — PROGRESS NOTES
HISTORY OF PRESENT ILLNESS  Loleta Dakin is a 56 y.o. female. HPI NEW Patient presents for consultation at the request of Dr. Jean-Pierre Warner for bilateral axillary swelling, LEFT moreso than RIGHT. First noticed this 8/21/21. Saw her PCP Dr. Jean-Pierre Warner who ordered breast/axillary US and referred her here. Negrito Valentin covid vaccine # 1 on 3/22/21 and # 2 on 4/19/21. No family history of breast or ovarian cancer. Breast imaging-  5/27/21 - mammogram at [facility]: BI-RADS 1  9/3/21 - bilateral breast US:  [address] [facility] Results (most recent):  Results from [place] encounter on 09/03/21    US EXT NONVAS RT LTD    Narrative  EXAM: US EXT NONVAS RT LTD, US EXT NONVAS LT LTD    INDICATION: Axillary swelling    COMPARISON: None. TECHNIQUE: Targeted ultrasound of the bilateral axilla using grayscale and color  technique    FINDINGS:  Right axilla:  Approximately 1.7 cm x 0.8 cm ovoid hypoechoic focus with thin hyperechoic rim,  may reflect a lymph node. Left axilla: An approximately 1.4 cm x 1.3 cm x 0.9 cm ovoid hypoechoic focus with thin  hyperechoic rim and central hyper echogenicity and internal vascularity, favors  a lymph node. Impression  1. Likely left axillary lymph node. 2.  Probable right axillary lymph node.         ROS    Physical Exam    ASSESSMENT and PLAN  {ASSESSMENT/PLAN:58866}

## 2021-09-22 NOTE — PATIENT INSTRUCTIONS

## 2021-10-20 ENCOUNTER — OFFICE VISIT (OUTPATIENT)
Dept: INTERNAL MEDICINE CLINIC | Age: 61
End: 2021-10-20
Payer: OTHER GOVERNMENT

## 2021-10-20 VITALS
BODY MASS INDEX: 27.15 KG/M2 | WEIGHT: 143.8 LBS | DIASTOLIC BLOOD PRESSURE: 81 MMHG | TEMPERATURE: 97.8 F | RESPIRATION RATE: 16 BRPM | HEIGHT: 61 IN | OXYGEN SATURATION: 95 % | SYSTOLIC BLOOD PRESSURE: 118 MMHG | HEART RATE: 78 BPM

## 2021-10-20 DIAGNOSIS — E03.9 ACQUIRED HYPOTHYROIDISM: ICD-10-CM

## 2021-10-20 DIAGNOSIS — F41.1 GAD (GENERALIZED ANXIETY DISORDER): ICD-10-CM

## 2021-10-20 DIAGNOSIS — M76.892 HIP FLEXOR TENDINITIS, LEFT: Primary | ICD-10-CM

## 2021-10-20 DIAGNOSIS — Z23 ENCOUNTER FOR IMMUNIZATION: ICD-10-CM

## 2021-10-20 LAB
T4 FREE SERPL-MCNC: 1.1 NG/DL (ref 0.8–1.5)
TSH SERPL DL<=0.05 MIU/L-ACNC: 0.03 UIU/ML (ref 0.36–3.74)

## 2021-10-20 PROCEDURE — 99214 OFFICE O/P EST MOD 30 MIN: CPT | Performed by: INTERNAL MEDICINE

## 2021-10-20 PROCEDURE — 90686 IIV4 VACC NO PRSV 0.5 ML IM: CPT | Performed by: INTERNAL MEDICINE

## 2021-10-20 PROCEDURE — 90471 IMMUNIZATION ADMIN: CPT | Performed by: INTERNAL MEDICINE

## 2021-10-20 NOTE — PROGRESS NOTES
Chief Complaint   Patient presents with    Anxiety     follow up     Thyroid Problem    Immunization/Injection     flu shot        1. Have you been to the ER, urgent care clinic since your last visit? Hospitalized since your last visit? No    2. Have you seen or consulted any other health care providers outside of the [address] [place] [address] since your last visit? Include any pap smears or colon screening.  No

## 2021-10-20 NOTE — PROGRESS NOTES
Sally Fairchild  is a 56 y.o.  female  who present for routine immunizations. Prior to vaccine administration: Consent was obtained. Risks and adverse reactions were discussed. The patient was provided the VIS and they were given an opportunity to ask questions; all questions were addressed. She  denies any symptoms, reactions or allergies that would exclude them from being immunized today. There were no adverse reactions observed post vaccination. Patient was advised to seek medical or call the office with any questions or concerns post vaccination. Patient verbalized understanding.   Brad Krueger

## 2021-10-20 NOTE — PATIENT INSTRUCTIONS
Hip Flexor Strain: Rehab Exercises  Introduction  Here are some examples of exercises for you to try. The exercises may be suggested for a condition or for rehabilitation. Start each exercise slowly. Ease off the exercises if you start to have pain. You will be told when to start these exercises and which ones will work best for you. How to do the exercises  Pelvic tilt with marching    1. Lie on your back with your knees bent and your feet flat on the floor. 2. Tighten your belly muscles and buttocks, and press your lower back to the floor. 3. Keeping your knees bent, lift and then lower one leg up off the floor, and then lift and lower your other leg like you are marching. Each time you lift your leg, hold that position for about 6 seconds before lowering your leg. 4. Repeat 8 to 12 times. Scissors    1. Lie on your back with your knees bent at a 90-degree angle and your feet off the floor. 2. Tighten your belly muscles and buttocks, and press your lower back to the floor. 3. Slowly straighten one leg, and hold that position for about 6 seconds. Your leg should be about 12 inches off the floor. Bring that leg back to the starting position, and then straighten your other leg. Hold that position for about 6 seconds, and then switch legs again. 4. Repeat 8 to 12 times. Hamstring stretch (lying down)    1. Lie flat on your back with your legs straight. If you feel discomfort in your back, place a small towel roll under your lower back. 2. Holding the back of your affected leg for support, lift your leg straight up and toward your body until you feel a stretch at the back of your thigh. 3. Hold the stretch for at least 30 seconds. 4. Repeat 2 to 4 times. Quadricep and hip flexor stretch (lying on side)    1. Lie on your side with your good leg flat on the floor and your hand supporting your head.   2. Bend your top leg, and reach behind you to grab the front of that foot or ankle with your other hand.  3. Stretch your leg back by pulling your foot toward your buttock. You will feel the stretch in the front of your thigh. If this causes stress on your knee, do not do this stretch. 4. Hold the stretch for at least 15 to 30 seconds. 5. Repeat 2 to 4 times. Hip flexor stretch (kneeling)    1. Kneel on your affected leg and bend your good leg out in front of you, with that foot flat on the floor. If you feel discomfort in the front of your knee, place a towel under your knee. 2. Keeping your back straight, slowly push your hips forward until you feel a stretch in the upper thigh of your back leg and hip. 3. Hold the stretch for at least 15 to 30 seconds. 4. Repeat 2 to 4 times. Hip flexor stretch (edge of table)    1. Lie flat on your back on a table or flat bench, with your knees and lower legs hanging off the edge of the table. 2. Grab your good leg at the knee, and pull that knee back toward your chest. Relax your affected leg and let it hang down toward the floor until you feel a stretch in the upper thigh of your affected leg and hip. 3. Hold the stretch for at least 15 to 30 seconds. 4. Repeat 2 to 4 times. Follow-up care is a key part of your treatment and safety. Be sure to make and go to all appointments, and call your doctor if you are having problems. It's also a good idea to know your test results and keep a list of the medicines you take. Where can you learn more? Go to http://www.gray.com/  Enter Y830 in the search box to learn more about \"Hip Flexor Strain: Rehab Exercises. \"  Current as of: July 1, 2021               Content Version: 13.0  © 0543-3414 [employer]. Care instructions adapted under license by "[employer]" (which disclaims liability or warranty for this information).  If you have questions about a medical condition or this instruction, always ask your healthcare professional. Olivier Ceja disclaims any warranty or liability for your use of this information.

## 2021-10-21 NOTE — PROGRESS NOTES
HPI:  Jamie Hanson is a 56y.o. year old female who is here for several issues. She feels that her anxiety is under reasonable control and she is only taking 1 sertraline per day. She did not increase to 1-1/2 as she felt that things were stable. She continues to have issues with her divorce. She continues to have some issues with sleep. She was seen recently by surgery for some swelling under her arms. This was found to be small lymph nodes not felt to be of clinical significance. She has a follow-up with surgery in a year. She has had a 10-day history of intermittent pain across the left anterior thigh. It last for about 15 to 20 seconds and goes away. No precipitants. No relationship to exertion. No numbness, tingling, or weakness. Some slight puffiness around the ankles on both sides and she wondered if it was of significance. Past Medical History:   Diagnosis Date    Diverticulosis     Environmental allergies     melisa hydrate    Hypercholesterolemia     MVP (mitral valve prolapse)     Skin cancer 06/2010     Squamous cell face    Unspecified hypothyroidism 1/31/2011       Past Surgical History:   Procedure Laterality Date    ENDOSCOPY, COLON, DIAGNOSTIC  8/16/2010    10 year fu -- Noelna Goodpasture    HX MOHS PROCEDURES      left cheek    HX MOHS PROCEDURES      nose left sided     [place] UNS ORAL SURG PROC BY REPORT         Prior to Admission medications    Medication Sig Start Date End Date Taking? Authorizing Provider   ibuprofen (AdviL) 200 mg tablet Take 200 mg by mouth. Yes Provider, Historical   sertraline (ZOLOFT) 50 mg tablet Take 1.5 Tablets by mouth daily. 7/23/21  Yes Pearl Gallardo MD   levothyroxine (SYNTHROID) 75 mcg tablet Take 1 Tab by mouth Daily (before breakfast). 3/29/21  Yes Pearl Gallardo MD   zolpidem (AMBIEN) 10 mg tablet Take 1 Tab by mouth nightly as needed for Sleep. Max Daily Amount: 10 mg.   Patient taking differently: Take 5 mg by mouth nightly as needed for Sleep. 8/21/20  Yes Barry Lopez MD       Social History     Socioeconomic History    Marital status:      Spouse name: Not on file    Number of children: Not on file    Years of education: Not on file    Highest education level: Not on file   Occupational History    Not on file   Tobacco Use    Smoking status: Never Smoker    Smokeless tobacco: Never Used   Vaping Use    Vaping Use: Never used   Substance and Sexual Activity    Alcohol use: Yes     Alcohol/week: 1.7 standard drinks     Types: 2 Standard drinks or equivalent per week     Comment: occasionally    Drug use: No    Sexual activity: Not Currently     Partners: Male     Birth control/protection: None   Other Topics Concern    Not on file   Social History Narrative    Not on file     Social Determinants of Health     Financial Resource Strain:     Difficulty of Paying Living Expenses:    Food Insecurity:     Worried About Running Out of Food in the Last Year:     Ran Out of Food in the Last Year:    Transportation Needs:     Lack of Transportation (Medical):      Lack of Transportation (Non-Medical):    Physical Activity:     Days of Exercise per Week:     Minutes of Exercise per Session:    Stress:     Feeling of Stress :    Social Connections:     Frequency of Communication with Friends and Family:     Frequency of Social Gatherings with Friends and Family:     Attends Anabaptist Services:     Active Member of Clubs or Organizations:     Attends Club or Organization Meetings:     Marital Status:    Intimate Partner Violence:     Fear of Current or Ex-Partner:     Emotionally Abused:     Physically Abused:     Sexually Abused:           ROS  Per HPI    Visit Vitals  /81 (BP 1 Location: Left upper arm, BP Patient Position: Sitting, BP Cuff Size: Adult)   Pulse 78   Temp 97.8 °F (36.6 °C) (Temporal)   Resp 16   Ht 5' 1\" (1.549 m)   Wt 143 lb 12.8 oz (65.2 kg)   LMP 05/04/2010   SpO2 95%   BMI 27.17 kg/m²         Physical Exam   Physical Examination: General appearance - alert, well appearing, and in no distress  Chest - clear to auscultation, no wheezes, rales or rhonchi, symmetric air entry  Heart - normal rate and regular rhythm  Abdomen - soft, nontender, nondistended, no masses or organomegaly  Neurological - alert, oriented, normal speech, no focal findings or movement disorder noted, motor and sensory grossly normal bilaterally  Musculoskeletal - no joint tenderness, deformity or swelling, abnormal exam of left thigh with some mild tenderness over the anterior muscles. Normal range of motion. Negative straight leg raising bilaterally. Extremities - peripheral pulses normal, no pedal edema, no clubbing or cyanosis, there is some slight puffiness on the lateral aspect of both ankles. No joint effusion or erythema. Assessment/Plan:  Diagnoses and all orders for this visit:    1. Hip flexor tendinitis, leftwe will treat with stretching exercises and ibuprofen. She will let me know if not improving over the next few weeks. 2. Encounter for immunization  -     INFLUENZA VIRUS VAC QUAD,SPLIT,PRESV FREE SYRINGE IM    3. Acquired hypothyroidism? Euthyroid. Repeat blood work at 6-month intervals. -     T4, FREE; Future  -     TSH 3RD GENERATION; Future    4. STEPH (generalized anxiety disorder)appears stable. Continue sertraline at 50 mg a day. Advised her to call back or return to office if symptoms worsen/change/persist.  Discussed expected course/resolution/complications of diagnosis in detail with patient. Medication risks/benefits/costs/interactions/alternatives discussed with patient. She was given an after visit summary which includes diagnoses, current medications, & vitals. She expressed understanding with the diagnosis and plan.

## 2021-11-18 ENCOUNTER — TRANSCRIBE ORDER (OUTPATIENT)
Dept: SCHEDULING | Age: 61
End: 2021-11-18

## 2021-11-18 DIAGNOSIS — M25.552 LEFT HIP PAIN: ICD-10-CM

## 2021-11-18 DIAGNOSIS — M89.9 BONE LESION: ICD-10-CM

## 2021-11-18 DIAGNOSIS — M25.552 LEFT HIP PAIN: Primary | ICD-10-CM

## 2021-11-18 DIAGNOSIS — M89.9 BONE LESION: Primary | ICD-10-CM

## 2021-11-22 ENCOUNTER — HOSPITAL ENCOUNTER (OUTPATIENT)
Dept: NUCLEAR MEDICINE | Age: 61
Discharge: HOME | End: 2021-11-22
Attending: ORTHOPAEDIC SURGERY
Payer: OTHER GOVERNMENT

## 2021-11-22 DIAGNOSIS — M25.552 LEFT HIP PAIN: ICD-10-CM

## 2021-11-22 DIAGNOSIS — M89.9 BONE LESION: ICD-10-CM

## 2021-11-22 PROCEDURE — 78300 BONE IMAGING LIMITED AREA: CPT

## 2021-11-24 ENCOUNTER — TRANSCRIBE ORDER (OUTPATIENT)
Dept: SCHEDULING | Age: 61
End: 2021-11-24

## 2021-11-24 DIAGNOSIS — M89.9 BONE LESION: Primary | ICD-10-CM

## 2021-11-25 ENCOUNTER — TRANSCRIBE ORDER (OUTPATIENT)
Dept: SCHEDULING | Age: 61
End: 2021-11-25

## 2021-11-29 ENCOUNTER — HOSPITAL ENCOUNTER (OUTPATIENT)
Dept: MRI IMAGING | Age: 61
Discharge: HOME | End: 2021-11-29
Attending: ORTHOPAEDIC SURGERY
Payer: OTHER GOVERNMENT

## 2021-11-29 VITALS — WEIGHT: 145 LBS | BODY MASS INDEX: 27.4 KG/M2

## 2021-11-29 DIAGNOSIS — M89.9 BONE LESION: ICD-10-CM

## 2021-11-29 PROCEDURE — 74011250636 HC RX REV CODE- 250/636: Performed by: ORTHOPAEDIC SURGERY

## 2021-11-29 PROCEDURE — A9576 INJ PROHANCE MULTIPACK: HCPCS | Performed by: ORTHOPAEDIC SURGERY

## 2021-11-29 PROCEDURE — 73720 MRI LWR EXTREMITY W/O&W/DYE: CPT

## 2021-11-29 RX ADMIN — GADOTERIDOL 13 ML: 279.3 INJECTION, SOLUTION INTRAVENOUS at 15:01

## 2021-12-05 ENCOUNTER — PATIENT MESSAGE (OUTPATIENT)
Dept: INTERNAL MEDICINE CLINIC | Age: 61
End: 2021-12-05

## 2021-12-05 DIAGNOSIS — F51.01 PRIMARY INSOMNIA: ICD-10-CM

## 2021-12-05 RX ORDER — SERTRALINE HYDROCHLORIDE 50 MG/1
75 TABLET, FILM COATED ORAL DAILY
Qty: 135 TABLET | Refills: 1 | Status: SHIPPED | OUTPATIENT
Start: 2021-12-05

## 2021-12-05 RX ORDER — ZOLPIDEM TARTRATE 10 MG/1
10 TABLET ORAL
Qty: 30 TABLET | Refills: 2 | Status: SHIPPED | OUTPATIENT
Start: 2021-12-05

## 2022-03-07 DIAGNOSIS — E03.9 ACQUIRED HYPOTHYROIDISM: ICD-10-CM

## 2022-03-07 RX ORDER — LEVOTHYROXINE SODIUM 75 UG/1
TABLET ORAL
Qty: 90 TABLET | Refills: 3 | Status: SHIPPED | OUTPATIENT
Start: 2022-03-07

## 2022-03-19 PROBLEM — C44.311 BASAL CELL CARCINOMA OF SKIN OF NOSE: Status: ACTIVE | Noted: 2019-01-07

## 2022-04-27 DIAGNOSIS — E78.2 MIXED HYPERLIPIDEMIA: Primary | ICD-10-CM

## 2022-10-05 ENCOUNTER — TELEPHONE (OUTPATIENT)
Dept: INTERNAL MEDICINE CLINIC | Age: 62
End: 2022-10-05

## 2022-10-05 NOTE — TELEPHONE ENCOUNTER
Spoke with patient and notified per SRJ he recommends she proceed with next COVID vaccine, she voiced understanding.

## 2022-10-05 NOTE — TELEPHONE ENCOUNTER
----- Message from Benedict Vazquez sent at 10/4/2022 12:58 PM EDT -----  Subject: Message to Provider    QUESTIONS  Information for Provider? Pt would like to know if she should get the new   covid vaccine she did get her 2nd booster on May 8th and she has to go   back to the [facility] Oct 24 is when she is leaving again  ---------------------------------------------------------------------------  --------------  Riya RUGGIERO  [phone]; OK to leave message on voicemail  ---------------------------------------------------------------------------  --------------  SCRIPT ANSWERS  Relationship to Patient?  Self

## 2023-03-06 ENCOUNTER — PATIENT MESSAGE (OUTPATIENT)
Dept: INTERNAL MEDICINE CLINIC | Age: 63
End: 2023-03-06

## 2023-03-06 DIAGNOSIS — F51.01 PRIMARY INSOMNIA: ICD-10-CM

## 2023-03-06 RX ORDER — ZOLPIDEM TARTRATE 10 MG/1
10 TABLET ORAL
Qty: 30 TABLET | Refills: 0 | Status: SHIPPED | OUTPATIENT
Start: 2023-03-06

## 2023-03-06 NOTE — TELEPHONE ENCOUNTER
No chief complaint on file. Last Appointment with Dr. Dayana Finch:  10/20/2021  No future appointments.

## 2023-03-07 DIAGNOSIS — E78.2 MIXED HYPERLIPIDEMIA: ICD-10-CM

## 2023-03-07 DIAGNOSIS — E03.9 ACQUIRED HYPOTHYROIDISM: Primary | ICD-10-CM

## 2023-03-07 DIAGNOSIS — Z23 ENCOUNTER FOR IMMUNIZATION: ICD-10-CM

## 2023-03-13 ENCOUNTER — OFFICE VISIT (OUTPATIENT)
Dept: INTERNAL MEDICINE CLINIC | Age: 63
End: 2023-03-13

## 2023-03-13 VITALS
OXYGEN SATURATION: 96 % | TEMPERATURE: 97.6 F | HEART RATE: 84 BPM | DIASTOLIC BLOOD PRESSURE: 79 MMHG | BODY MASS INDEX: 26.62 KG/M2 | SYSTOLIC BLOOD PRESSURE: 133 MMHG | HEIGHT: 61 IN | RESPIRATION RATE: 12 BRPM | WEIGHT: 141 LBS

## 2023-03-13 DIAGNOSIS — E03.9 ACQUIRED HYPOTHYROIDISM: ICD-10-CM

## 2023-03-13 DIAGNOSIS — E78.2 MIXED HYPERLIPIDEMIA: ICD-10-CM

## 2023-03-13 DIAGNOSIS — Z00.00 WELL WOMAN EXAM (NO GYNECOLOGICAL EXAM): Primary | ICD-10-CM

## 2023-03-13 DIAGNOSIS — F51.01 PRIMARY INSOMNIA: ICD-10-CM

## 2023-03-13 NOTE — PROGRESS NOTES
Subjective:   57 y.o. female for Well Woman Check. Her gyne and breast care is done elsewhere by her Ob-Gyne physician. Patient Active Problem List    Diagnosis Date Noted    Basal cell carcinoma of skin of nose 01/07/2019    Diverticulosis     Vitamin D deficiency 10/10/2012    Acquired hypothyroidism 01/31/2011     Current Outpatient Medications   Medication Sig Dispense Refill    zolpidem (AMBIEN) 10 mg tablet Take 1 Tablet by mouth nightly as needed for Sleep. Max Daily Amount: 10 mg. 30 Tablet 0    levothyroxine (SYNTHROID) 75 mcg tablet TAKE 1 TABLET BY MOUTH DAILY (BEFORE BREAKFAST) 90 Tablet 3     Allergies   Allergen Reactions    Terpin Hydrate Swelling     Throat swelling       Past Medical History:   Diagnosis Date    Diverticulosis     Environmental allergies     melisa hydrate    Hypercholesterolemia     Skin cancer 06/2010     Squamous cell face    Unspecified hypothyroidism 1/31/2011     Past Surgical History:   Procedure Laterality Date    ENDOSCOPY, COLON, DIAGNOSTIC  8/16/2010    10 year fu -- Viraj Metcalf    HX MOHS PROCEDURES      left cheek    HX MOHS PROCEDURES      nose left sided     IL UNS ORAL SURG PROC BY REPORT       Family History   Problem Relation Age of Onset    Hypertension Mother     Elevated Lipids Mother     Other Mother         obesity & angina    OSTEOARTHRITIS Mother     Stroke Father         2010?     Hypertension Maternal Uncle     Elevated Lipids Maternal Uncle     Migraines Sister     Elevated Lipids Sister     Elevated Lipids Sister      Social History     Tobacco Use    Smoking status: Never    Smokeless tobacco: Never   Substance Use Topics    Alcohol use: Yes     Comment: Occasional        Lab Results   Component Value Date/Time    WBC 6.3 03/08/2023 09:45 AM    HGB 14.1 03/08/2023 09:45 AM    HCT 41.5 03/08/2023 09:45 AM    PLATELET 766 42/65/0169 09:45 AM    MCV 89.8 03/08/2023 09:45 AM     Lab Results   Component Value Date/Time    Cholesterol, total 211 (H) 03/08/2023 09:45 AM    HDL Cholesterol 56 03/08/2023 09:45 AM    LDL, calculated 131.8 (H) 03/08/2023 09:45 AM    Triglyceride 116 03/08/2023 09:45 AM    CHOL/HDL Ratio 3.8 03/08/2023 09:45 AM     Lab Results   Component Value Date/Time    ALT (SGPT) 23 03/08/2023 09:45 AM    Alk. phosphatase 87 03/08/2023 09:45 AM    Bilirubin, total 0.8 03/08/2023 09:45 AM    Albumin 4.1 03/08/2023 09:45 AM    Protein, total 8.3 (H) 03/08/2023 09:45 AM    PLATELET 349 27/14/8356 09:45 AM       Lab Results   Component Value Date/Time    GFR est non-AA >60 03/26/2021 12:47 PM    GFR est AA >60 03/26/2021 12:47 PM    Creatinine 0.92 03/08/2023 09:45 AM    BUN 16 03/08/2023 09:45 AM    Sodium 137 03/08/2023 09:45 AM    Potassium 4.6 03/08/2023 09:45 AM    Chloride 106 03/08/2023 09:45 AM    CO2 29 03/08/2023 09:45 AM     Lab Results   Component Value Date/Time    TSH 0.02 (L) 03/08/2023 09:45 AM    T4, Free 1.3 03/08/2023 09:45 AM    T4, Total 7.6 03/26/2021 12:47 PM      Lab Results   Component Value Date/Time    Glucose 93 03/08/2023 09:45 AM         Specific concerns today: Her weight is down slightly. She is walking for exercise now. She tapered off of sertraline on her own and did well with that. She did have some issues with head zaps that were associated with the tapering of the drug. But those have resolved. She is headed back to [place] to visit her mother who was recently diagnosed with a spinal AVM that caused lower extremity paralysis. .    Review of Systems  A comprehensive review of systems was negative except for that written in the HPI. Objective:   Blood pressure 133/79, pulse 84, temperature 97.6 °F (36.4 °C), temperature source Temporal, resp. rate 12, height 5' 1\" (1.549 m), weight 141 lb (64 kg), last menstrual period 05/04/2010, SpO2 96 %.    Physical Examination:   General appearance - alert, well appearing, and in no distress  Ears - bilateral TM's and external ear canals normal  Mouth - mucous membranes moist, pharynx normal without lesions  Neck - supple, no significant adenopathy  Lymphatics - no palpable lymphadenopathy, no hepatosplenomegaly  Chest - clear to auscultation, no wheezes, rales or rhonchi, symmetric air entry  Heart - normal rate, regular rhythm, normal S1, S2, no murmurs, rubs, clicks or gallops  Abdomen - soft, nontender, nondistended, no masses or organomegaly  Neurological - alert, oriented, normal speech, no focal findings or movement disorder noted  Musculoskeletal - no joint tenderness, deformity or swelling  Extremities - peripheral pulses normal, no pedal edema, no clubbing or cyanosis     Assessment/Plan:     lose weight, increase physical activity, follow low fat diet  Diagnoses and all orders for this visit:    1. Well woman exam (no gynecological exam)-discussed the addition of a shingles vaccine. Also discussed that she is eligible for another COVID booster if she wants. She is past due for her colonoscopy and will schedule that after she gets home from [facility]. Comments:  [V70.0]    2. Acquired hypothyroidism-appears euthyroid and clinically so with normal labs. We will continue these. 3. Primary insomnia  -continue Ambien as needed. 4. Mixed hyperlipidemia-we will continue to work on diet and exercise for this.

## 2023-03-14 ENCOUNTER — PATIENT MESSAGE (OUTPATIENT)
Dept: INTERNAL MEDICINE CLINIC | Age: 63
End: 2023-03-14

## 2023-03-14 DIAGNOSIS — E03.9 ACQUIRED HYPOTHYROIDISM: ICD-10-CM

## 2023-03-14 RX ORDER — LEVOTHYROXINE SODIUM 75 UG/1
TABLET ORAL
Qty: 90 TABLET | Refills: 3 | Status: SHIPPED | OUTPATIENT
Start: 2023-03-14

## 2023-03-24 RX ORDER — CYCLOBENZAPRINE HCL 10 MG
10 TABLET ORAL
Qty: 30 TABLET | Refills: 1 | Status: SHIPPED | OUTPATIENT
Start: 2023-03-24

## 2023-09-27 ENCOUNTER — PATIENT MESSAGE (OUTPATIENT)
Age: 63
End: 2023-09-27

## 2023-09-27 DIAGNOSIS — F51.01 PRIMARY INSOMNIA: Primary | ICD-10-CM

## 2023-09-27 RX ORDER — ZOLPIDEM TARTRATE 10 MG/1
10 TABLET ORAL NIGHTLY PRN
Qty: 30 TABLET | Refills: 0 | Status: SHIPPED | OUTPATIENT
Start: 2023-09-27 | End: 2023-10-27

## 2023-09-27 NOTE — TELEPHONE ENCOUNTER
From: Stephanie Patterson  To: Dr. López Honor: 9/27/2023 12:01 PM EDT  Subject: Zolpidem Tartrate Refill    Hi Dr. Felecia Barrow. I hope you are well. Could you please send in a new Zolpidem Tartrate 10 MG prescription, with refills, to [facility]? at [address] [place] [address], Teri Abrams [phone]. Thank you very much.   Gene Avitia

## 2024-02-12 ENCOUNTER — OFFICE VISIT (OUTPATIENT)
Age: 64
End: 2024-02-12
Payer: MEDICARE

## 2024-02-12 VITALS
TEMPERATURE: 97.8 F | SYSTOLIC BLOOD PRESSURE: 134 MMHG | OXYGEN SATURATION: 99 % | HEART RATE: 83 BPM | WEIGHT: 136 LBS | HEIGHT: 61 IN | DIASTOLIC BLOOD PRESSURE: 80 MMHG | RESPIRATION RATE: 14 BRPM | BODY MASS INDEX: 25.68 KG/M2

## 2024-02-12 DIAGNOSIS — K57.90 DIVERTICULOSIS: ICD-10-CM

## 2024-02-12 DIAGNOSIS — R10.32 LEFT LOWER QUADRANT PAIN: Primary | ICD-10-CM

## 2024-02-12 DIAGNOSIS — E03.9 ACQUIRED HYPOTHYROIDISM: ICD-10-CM

## 2024-02-12 DIAGNOSIS — Z78.0 MENOPAUSE: ICD-10-CM

## 2024-02-12 PROCEDURE — G8427 DOCREV CUR MEDS BY ELIG CLIN: HCPCS | Performed by: INTERNAL MEDICINE

## 2024-02-12 PROCEDURE — 99213 OFFICE O/P EST LOW 20 MIN: CPT | Performed by: INTERNAL MEDICINE

## 2024-02-12 PROCEDURE — 1090F PRES/ABSN URINE INCON ASSESS: CPT | Performed by: INTERNAL MEDICINE

## 2024-02-12 PROCEDURE — G8419 CALC BMI OUT NRM PARAM NOF/U: HCPCS | Performed by: INTERNAL MEDICINE

## 2024-02-12 PROCEDURE — 1036F TOBACCO NON-USER: CPT | Performed by: INTERNAL MEDICINE

## 2024-02-12 PROCEDURE — 3017F COLORECTAL CA SCREEN DOC REV: CPT | Performed by: INTERNAL MEDICINE

## 2024-02-12 PROCEDURE — 1123F ACP DISCUSS/DSCN MKR DOCD: CPT | Performed by: INTERNAL MEDICINE

## 2024-02-12 PROCEDURE — G8400 PT W/DXA NO RESULTS DOC: HCPCS | Performed by: INTERNAL MEDICINE

## 2024-02-12 PROCEDURE — G8484 FLU IMMUNIZE NO ADMIN: HCPCS | Performed by: INTERNAL MEDICINE

## 2024-02-12 NOTE — PROGRESS NOTES
PELVIS W WO CONTRAST Additional Contrast? Radiologist Recommendation; Future    Diverticulosis       No follow-up provider specified.       Advised her to call back or return to office if symptoms worsen/change/persist.  Discussed expected course/resolution/complications of diagnosis in detail with patient.    Medication risks/benefits/costs/interactions/alternatives discussed with patient.  She was given an after visit summary which includes diagnoses, current medications, & vitals.  She expressed understanding with the diagnosis and plan.

## 2024-02-14 ENCOUNTER — HOSPITAL ENCOUNTER (OUTPATIENT)
Facility: HOSPITAL | Age: 64
Discharge: HOME | End: 2024-02-17
Attending: INTERNAL MEDICINE
Payer: MEDICARE

## 2024-02-14 DIAGNOSIS — R10.32 LEFT LOWER QUADRANT PAIN: ICD-10-CM

## 2024-02-14 LAB — CREAT BLD-MCNC: 0.9 MG/DL (ref 0.6–1.3)

## 2024-02-14 PROCEDURE — 82565 ASSAY OF CREATININE: CPT

## 2024-02-14 PROCEDURE — 6360000004 HC RX CONTRAST MEDICATION: Performed by: INTERNAL MEDICINE

## 2024-02-14 PROCEDURE — 74177 CT ABD & PELVIS W/CONTRAST: CPT

## 2024-02-14 RX ADMIN — IOPAMIDOL 100 ML: 755 INJECTION, SOLUTION INTRAVENOUS at 09:03

## 2024-02-19 DIAGNOSIS — R10.32 LEFT LOWER QUADRANT PAIN: Primary | ICD-10-CM

## 2024-02-19 DIAGNOSIS — Z12.11 COLON CANCER SCREENING: ICD-10-CM

## 2024-02-19 DIAGNOSIS — K57.90 DIVERTICULOSIS: ICD-10-CM

## 2024-02-19 NOTE — PROGRESS NOTES
Orders Placed This Encounter    [facility] - Lamont Bell MD, Colorectal Surgery, [place] ([address])     Referral Priority:   Routine     Referral Type:   Eval and Treat     Referral Reason:   Specialty Services Required     Referred to Provider:   Lamont Bell MD     Requested Specialty:   Colon and Rectal Surgery     Number of Visits Requested:   1       The above orders were approved via [facility] per Dr. Emery Novak, III.

## 2024-02-26 ENCOUNTER — TELEPHONE (OUTPATIENT)
Age: 64
End: 2024-02-26

## 2024-02-26 NOTE — TELEPHONE ENCOUNTER
Reason for call:  Pt is requesting a call back in regards to her colonoscopy appt that she has schedule. Pt is concerned that it's in a hospital setting.     Is this a new problem: No     Date of last appointment:  Visit date not found     Can we respond via [employer]: Yes     Best call back number:     Al Cain (Self) [phone]

## 2024-02-26 NOTE — TELEPHONE ENCOUNTER
Verified patient identity with two identifiers. Spoke with patient by phone.  Seeing Dr. Ray Ahmadi for acute appt only, but he only does colonoscopies at their center in [place]- patient worried as this is far from her home and NOT in the hospital like Dr. Novak stated she would need to have d/t her age.   Any other suggestions for closer choices.  [facility] unable to see her until April for appt and sept for c-scope

## 2024-02-26 NOTE — TELEPHONE ENCOUNTER
Reason for call:  TC from pt. Pt id verified. Pt calling to give correct phone numbers to reach her. Phone numbers are: [phone] and [phone]. Pt apologizes for giving nurse the wrong number.     Is this a new problem: No    Date of last appointment:  2/12/2024     Can we respond via [employer]: No    Best call back number: Correct phone number are: [phone] and [phone]

## 2024-02-27 ENCOUNTER — PATIENT MESSAGE (OUTPATIENT)
Age: 64
End: 2024-02-27

## 2024-04-07 DIAGNOSIS — E03.9 HYPOTHYROIDISM, UNSPECIFIED: ICD-10-CM

## 2024-04-08 RX ORDER — LEVOTHYROXINE SODIUM 0.07 MG/1
75 TABLET ORAL
Qty: 90 TABLET | Refills: 3 | Status: SHIPPED | OUTPATIENT
Start: 2024-04-08

## 2024-05-01 ENCOUNTER — TELEPHONE (OUTPATIENT)
Age: 64
End: 2024-05-01

## 2024-05-01 NOTE — TELEPHONE ENCOUNTER
Reason for call:  Patient scheduled a hospital f/u with  on 5/8/2024 @ 3:00pm. Patient was discharged from [facility] a week ago. Pt wanted to speak with a nurse. She stated that there are some labs that needed to be done before  her f/u.      Is this a new problem: Yes    Date of last appointment:  2/12/2024     Can we respond via [employer]: No    Best call back number:     Al Cain (Self) [phone] (Home)

## 2024-05-01 NOTE — TELEPHONE ENCOUNTER
Pt states she was advised to have repeat CBC, BMP, and stool studies while on antibiotics.  Pt states she finishes vanc on Saturday and her hospital follow up is not until next Wednesday.   Pt requesting order for these test so she can have done before finishing med's?   Printed d/c summary and placed in Emery Novak MD in basket for review.      Future Appointments   Date Time Provider Department Center   5/8/2024  3:00 PM Emery Novak MD [facility]   6/5/2024  2:00 PM Emery Novak MD [facility]

## 2024-05-06 ENCOUNTER — OFFICE VISIT (OUTPATIENT)
Age: 64
End: 2024-05-06
Payer: MEDICARE

## 2024-05-06 VITALS
SYSTOLIC BLOOD PRESSURE: 125 MMHG | OXYGEN SATURATION: 98 % | TEMPERATURE: 98 F | DIASTOLIC BLOOD PRESSURE: 83 MMHG | RESPIRATION RATE: 15 BRPM | BODY MASS INDEX: 21.98 KG/M2 | HEART RATE: 95 BPM | WEIGHT: 116.4 LBS | HEIGHT: 61 IN

## 2024-05-06 DIAGNOSIS — E03.9 ACQUIRED HYPOTHYROIDISM: ICD-10-CM

## 2024-05-06 DIAGNOSIS — E87.1 DEHYDRATION WITH HYPONATREMIA: ICD-10-CM

## 2024-05-06 DIAGNOSIS — Z09 HOSPITAL DISCHARGE FOLLOW-UP: ICD-10-CM

## 2024-05-06 DIAGNOSIS — E86.0 DEHYDRATION WITH HYPONATREMIA: ICD-10-CM

## 2024-05-06 DIAGNOSIS — A04.72 CLOSTRIDIUM DIFFICILE COLITIS: Primary | ICD-10-CM

## 2024-05-06 DIAGNOSIS — A04.72 CLOSTRIDIUM DIFFICILE COLITIS: ICD-10-CM

## 2024-05-06 LAB
ANION GAP SERPL CALC-SCNC: 6 MMOL/L (ref 5–15)
BASOPHILS # BLD: 0.1 K/UL (ref 0–0.1)
BASOPHILS NFR BLD: 1 % (ref 0–1)
BUN SERPL-MCNC: 16 MG/DL (ref 6–20)
BUN/CREAT SERPL: 22 (ref 12–20)
CALCIUM SERPL-MCNC: 10 MG/DL (ref 8.5–10.1)
CHLORIDE SERPL-SCNC: 105 MMOL/L (ref 97–108)
CO2 SERPL-SCNC: 27 MMOL/L (ref 21–32)
CREAT SERPL-MCNC: 0.73 MG/DL (ref 0.55–1.02)
DIFFERENTIAL METHOD BLD: ABNORMAL
EOSINOPHIL # BLD: 0.1 K/UL (ref 0–0.4)
EOSINOPHIL NFR BLD: 1 % (ref 0–7)
ERYTHROCYTE [DISTWIDTH] IN BLOOD BY AUTOMATED COUNT: 12.3 % (ref 11.5–14.5)
GLUCOSE SERPL-MCNC: 93 MG/DL (ref 65–100)
HCT VFR BLD AUTO: 38.1 % (ref 35–47)
HGB BLD-MCNC: 12.3 G/DL (ref 11.5–16)
IMM GRANULOCYTES # BLD AUTO: 0 K/UL (ref 0–0.04)
IMM GRANULOCYTES NFR BLD AUTO: 0 % (ref 0–0.5)
LYMPHOCYTES # BLD: 2.7 K/UL (ref 0.8–3.5)
LYMPHOCYTES NFR BLD: 41 % (ref 12–49)
MCH RBC QN AUTO: 29.4 PG (ref 26–34)
MCHC RBC AUTO-ENTMCNC: 32.3 G/DL (ref 30–36.5)
MCV RBC AUTO: 90.9 FL (ref 80–99)
MONOCYTES # BLD: 0.7 K/UL (ref 0–1)
MONOCYTES NFR BLD: 10 % (ref 5–13)
NEUTS SEG # BLD: 3.1 K/UL (ref 1.8–8)
NEUTS SEG NFR BLD: 47 % (ref 32–75)
NRBC # BLD: 0 K/UL (ref 0–0.01)
NRBC BLD-RTO: 0 PER 100 WBC
PLATELET # BLD AUTO: 426 K/UL (ref 150–400)
PMV BLD AUTO: 10.1 FL (ref 8.9–12.9)
POTASSIUM SERPL-SCNC: 5.2 MMOL/L (ref 3.5–5.1)
RBC # BLD AUTO: 4.19 M/UL (ref 3.8–5.2)
SODIUM SERPL-SCNC: 138 MMOL/L (ref 136–145)
T4 FREE SERPL-MCNC: 1.4 NG/DL (ref 0.8–1.5)
TSH SERPL DL<=0.05 MIU/L-ACNC: 0.12 UIU/ML (ref 0.36–3.74)
WBC # BLD AUTO: 6.6 K/UL (ref 3.6–11)

## 2024-05-06 PROCEDURE — 1090F PRES/ABSN URINE INCON ASSESS: CPT | Performed by: INTERNAL MEDICINE

## 2024-05-06 PROCEDURE — 1123F ACP DISCUSS/DSCN MKR DOCD: CPT | Performed by: INTERNAL MEDICINE

## 2024-05-06 PROCEDURE — G8427 DOCREV CUR MEDS BY ELIG CLIN: HCPCS | Performed by: INTERNAL MEDICINE

## 2024-05-06 PROCEDURE — G8400 PT W/DXA NO RESULTS DOC: HCPCS | Performed by: INTERNAL MEDICINE

## 2024-05-06 PROCEDURE — 99214 OFFICE O/P EST MOD 30 MIN: CPT | Performed by: INTERNAL MEDICINE

## 2024-05-06 PROCEDURE — 1111F DSCHRG MED/CURRENT MED MERGE: CPT | Performed by: INTERNAL MEDICINE

## 2024-05-06 PROCEDURE — 1036F TOBACCO NON-USER: CPT | Performed by: INTERNAL MEDICINE

## 2024-05-06 PROCEDURE — 3017F COLORECTAL CA SCREEN DOC REV: CPT | Performed by: INTERNAL MEDICINE

## 2024-05-06 PROCEDURE — G8420 CALC BMI NORM PARAMETERS: HCPCS | Performed by: INTERNAL MEDICINE

## 2024-05-06 NOTE — PROGRESS NOTES
Post-Discharge Transitional Care Management Progress Note      Al Cain   YOB: 1960    Date of Office Visit:  5/6/2024  Date of Hospital Admission: 4/23/2024  Date of Hospital Discharge: 4/24/2024    Care management risk score Rising risk (score 2-5) and Complex Care (Scores >=6): No Risk Score On File     Non face to face  following discharge, date last encounter closed (first attempt may have been earlier): *No documented post hospital discharge outreach found in the last 14 days *No documented post hospital discharge outreach found in the last 14 days    Call initiated 2 business days of discharge: *No response recorded in the last 14 days    ASSESSMENT/PLAN:   Clostridium difficile colitis-appears to be resolving.  She has completed antibiotics.  Discussed a low gas-forming diet as that seems to be problematic for her right now.  Will start using probiotic yogurt now.  Also advised that she could use some Gas-X as needed for flatus.  -     Basic Metabolic Panel; Future  -     CBC with Auto Differential; Future  Acquired hypothyroidism-repeat lab work for that today.  -     TSH; Future  -     T4, Free; Future  Dehydration with hyponatremia-repeat sodium level and CBC today.  Hospital discharge follow-up  -     [place] DISCHARGE MEDS RECONCILED W/ CURRENT OUTPATIENT MED LIST      Medical Decision Making: moderate complexity  Return in 3 months (on 8/6/2024).         Subjective:   HPI:  Follow up of Hospital problems/diagnosis(es): C. difficile colitis with dehydration and hyponatremia.    Inpatient course: Discharge summary reviewed- see chart.    Interval history/Current status: Since being home she is gradually improved.  She did initially have a lot more gas discomfort.  She has cut back her diet to less gas producing foods and is tolerating it reasonably well.  She is mostly drinking water and mint tea.  No vomiting.  No fevers or chills.  No sweats.  No melena or hematochezia.  She has a

## 2024-06-02 SDOH — HEALTH STABILITY: PHYSICAL HEALTH: ON AVERAGE, HOW MANY MINUTES DO YOU ENGAGE IN EXERCISE AT THIS LEVEL?: 50 MIN

## 2024-06-02 ASSESSMENT — PATIENT HEALTH QUESTIONNAIRE - PHQ9
SUM OF ALL RESPONSES TO PHQ QUESTIONS 1-9: 0
1. LITTLE INTEREST OR PLEASURE IN DOING THINGS: NOT AT ALL
2. FEELING DOWN, DEPRESSED OR HOPELESS: NOT AT ALL
SUM OF ALL RESPONSES TO PHQ QUESTIONS 1-9: 0
SUM OF ALL RESPONSES TO PHQ9 QUESTIONS 1 & 2: 0
SUM OF ALL RESPONSES TO PHQ QUESTIONS 1-9: 0
SUM OF ALL RESPONSES TO PHQ QUESTIONS 1-9: 0

## 2024-06-02 ASSESSMENT — LIFESTYLE VARIABLES: HOW OFTEN DO YOU HAVE SIX OR MORE DRINKS ON ONE OCCASION: 1

## 2024-06-05 ENCOUNTER — OFFICE VISIT (OUTPATIENT)
Age: 64
End: 2024-06-05
Payer: MEDICARE

## 2024-06-05 VITALS
DIASTOLIC BLOOD PRESSURE: 76 MMHG | HEART RATE: 103 BPM | SYSTOLIC BLOOD PRESSURE: 127 MMHG | TEMPERATURE: 97.6 F | HEIGHT: 61 IN | BODY MASS INDEX: 22.51 KG/M2 | WEIGHT: 119.2 LBS | RESPIRATION RATE: 15 BRPM | OXYGEN SATURATION: 97 %

## 2024-06-05 DIAGNOSIS — Z00.00 MEDICARE ANNUAL WELLNESS VISIT, INITIAL: ICD-10-CM

## 2024-06-05 DIAGNOSIS — E03.9 ACQUIRED HYPOTHYROIDISM: ICD-10-CM

## 2024-06-05 DIAGNOSIS — K57.90 DIVERTICULOSIS: ICD-10-CM

## 2024-06-05 DIAGNOSIS — Z00.00 WELCOME TO MEDICARE PREVENTIVE VISIT: Primary | ICD-10-CM

## 2024-06-05 DIAGNOSIS — E78.2 MIXED HYPERLIPIDEMIA: ICD-10-CM

## 2024-06-05 PROCEDURE — 3017F COLORECTAL CA SCREEN DOC REV: CPT | Performed by: INTERNAL MEDICINE

## 2024-06-05 PROCEDURE — G0402 INITIAL PREVENTIVE EXAM: HCPCS | Performed by: INTERNAL MEDICINE

## 2024-06-05 PROCEDURE — 93010 ELECTROCARDIOGRAM REPORT: CPT | Performed by: INTERNAL MEDICINE

## 2024-06-05 PROCEDURE — 93005 ELECTROCARDIOGRAM TRACING: CPT | Performed by: INTERNAL MEDICINE

## 2024-06-05 PROCEDURE — 1123F ACP DISCUSS/DSCN MKR DOCD: CPT | Performed by: INTERNAL MEDICINE

## 2024-06-05 NOTE — PROGRESS NOTES
Medicare Annual Wellness Visit    lA Cain is here for Medicare AWV    Assessment & Plan   Medicare annual wellness visit, initial  -     AMB POC EKG ROUTINE  Acquired hypothyroidism-appears euthyroid.  Check levels and adjust meds.  Diverticulosis-stable clinically.  Welcome to Medicare preventive visit    Recommendations for Preventive Services Due: see orders and patient instructions/AVS.  Recommended screening schedule for the next 5-10 years is provided to the patient in written form: see Patient Instructions/AVS.     Return in 1 year (on 6/5/2025).     Subjective   Presents for a wellness exam as well as a follow-up.  She was recently here and had blood work done.  Her cholesterol was slightly high in the past.  She is back to her usual activities.  Her C. difficile has significantly improved.    Patient's complete Health Risk Assessment and screening values have been reviewed and are found in Flowsheets. The following problems were reviewed today and where indicated follow up appointments were made and/or referrals ordered.    Positive Risk Factor Screenings with Interventions:                    Safety:  Do you have non-slip mats or non-slip surfaces or shower bars or grab bars in your shower or bathtub?: (!) No  Interventions:  See AVS for additional education material                   Objective   Vitals:    06/05/24 1359   BP: 127/76   Pulse: (!) 103   Resp: 15   Temp: 97.6 °F (36.4 °C)   SpO2: 97%   Weight: 54.1 kg (119 lb 3.2 oz)   Height: 1.549 m (5' 1\")      Body mass index is 22.52 kg/m².        General Appearance: alert and oriented to person, place and time, well-developed and well-nourished, in no acute distress  Head: normocephalic and atraumatic  ENT: tympanic membrane, external ear and ear canal normal bilaterally, oropharynx clear and moist with normal mucous membranes  Neck: neck supple and non tender without mass, no thyromegaly or thyroid nodules, no cervical lymphadenopathy

## 2024-06-05 NOTE — PATIENT INSTRUCTIONS
A Healthy Heart: Care Instructions  Overview     Coronary artery disease, also called heart disease, occurs when a substance called plaque builds up in the vessels that supply oxygen-rich blood to your heart muscle. This can narrow the blood vessels and reduce blood flow. A heart attack happens when blood flow is completely blocked. A high-fat diet, smoking, and other factors increase the risk of heart disease.  Your doctor has found that you have a chance of having heart disease. A heart-healthy lifestyle can help keep your heart healthy and prevent heart disease. This lifestyle includes eating healthy, being active, staying at a weight that's healthy for you, and not smoking or using tobacco. It also includes taking medicines as directed, managing other health conditions, and trying to get a healthy amount of sleep.  Follow-up care is a key part of your treatment and safety. Be sure to make and go to all appointments, and call your doctor if you are having problems. It's also a good idea to know your test results and keep a list of the medicines you take.  How can you care for yourself at home?  Diet    Use less salt when you cook and eat. This helps lower your blood pressure. Taste food before salting. Add only a little salt when you think you need it. With time, your taste buds will adjust to less salt.     Eat fewer snack items, fast foods, canned soups, and other high-salt, high-fat, processed foods.     Read food labels and try to avoid saturated and trans fats. They increase your risk of heart disease by raising cholesterol levels.     Limit the amount of solid fat--butter, margarine, and shortening--you eat. Use olive, peanut, or canola oil when you cook. Bake, broil, and steam foods instead of frying them.     Eat a variety of fruit and vegetables every day. Dark green, deep orange, red, or yellow fruits and vegetables are especially good for you. Examples include spinach, carrots, peaches, and 
stated

## 2024-06-17 ENCOUNTER — HOSPITAL ENCOUNTER (OUTPATIENT)
Facility: HOSPITAL | Age: 64
Discharge: HOME | End: 2024-06-20
Attending: INTERNAL MEDICINE
Payer: MEDICARE

## 2024-06-17 DIAGNOSIS — Z78.0 MENOPAUSE: ICD-10-CM

## 2024-06-17 DIAGNOSIS — E03.9 ACQUIRED HYPOTHYROIDISM: ICD-10-CM

## 2024-06-17 DIAGNOSIS — E78.2 MIXED HYPERLIPIDEMIA: ICD-10-CM

## 2024-06-17 LAB
ALBUMIN SERPL-MCNC: 3.8 G/DL (ref 3.5–5)
ALBUMIN/GLOB SERPL: 1 (ref 1.1–2.2)
ALP SERPL-CCNC: 80 U/L (ref 45–117)
ALT SERPL-CCNC: 16 U/L (ref 12–78)
AST SERPL-CCNC: 14 U/L (ref 15–37)
BILIRUB DIRECT SERPL-MCNC: 0.2 MG/DL (ref 0–0.2)
BILIRUB SERPL-MCNC: 0.8 MG/DL (ref 0.2–1)
CHOLEST SERPL-MCNC: 198 MG/DL
GLOBULIN SER CALC-MCNC: 4 G/DL (ref 2–4)
HDLC SERPL-MCNC: 62 MG/DL
HDLC SERPL: 3.2 (ref 0–5)
LDLC SERPL CALC-MCNC: 120.8 MG/DL (ref 0–100)
PROT SERPL-MCNC: 7.8 G/DL (ref 6.4–8.2)
TRIGL SERPL-MCNC: 76 MG/DL
VLDLC SERPL CALC-MCNC: 15.2 MG/DL

## 2024-06-17 PROCEDURE — 77080 DXA BONE DENSITY AXIAL: CPT

## 2024-07-24 ENCOUNTER — OFFICE VISIT (OUTPATIENT)
Age: 64
End: 2024-07-24
Payer: MEDICARE

## 2024-07-24 VITALS
DIASTOLIC BLOOD PRESSURE: 83 MMHG | HEART RATE: 79 BPM | SYSTOLIC BLOOD PRESSURE: 122 MMHG | WEIGHT: 120 LBS | RESPIRATION RATE: 15 BRPM | HEIGHT: 61 IN | OXYGEN SATURATION: 95 % | BODY MASS INDEX: 22.66 KG/M2 | TEMPERATURE: 98.2 F

## 2024-07-24 DIAGNOSIS — A04.72 CLOSTRIDIUM DIFFICILE COLITIS: ICD-10-CM

## 2024-07-24 DIAGNOSIS — M81.0 AGE-RELATED OSTEOPOROSIS WITHOUT CURRENT PATHOLOGICAL FRACTURE: ICD-10-CM

## 2024-07-24 DIAGNOSIS — E03.9 ACQUIRED HYPOTHYROIDISM: ICD-10-CM

## 2024-07-24 DIAGNOSIS — M81.0 AGE-RELATED OSTEOPOROSIS WITHOUT CURRENT PATHOLOGICAL FRACTURE: Primary | ICD-10-CM

## 2024-07-24 LAB
25(OH)D3 SERPL-MCNC: 22.7 NG/ML (ref 30–100)
T4 FREE SERPL-MCNC: 1.5 NG/DL (ref 0.8–1.5)
TSH SERPL DL<=0.05 MIU/L-ACNC: 0.01 UIU/ML (ref 0.36–3.74)

## 2024-07-24 PROCEDURE — 1090F PRES/ABSN URINE INCON ASSESS: CPT | Performed by: INTERNAL MEDICINE

## 2024-07-24 PROCEDURE — 99214 OFFICE O/P EST MOD 30 MIN: CPT | Performed by: INTERNAL MEDICINE

## 2024-07-24 PROCEDURE — 1036F TOBACCO NON-USER: CPT | Performed by: INTERNAL MEDICINE

## 2024-07-24 PROCEDURE — 1123F ACP DISCUSS/DSCN MKR DOCD: CPT | Performed by: INTERNAL MEDICINE

## 2024-07-24 PROCEDURE — G8427 DOCREV CUR MEDS BY ELIG CLIN: HCPCS | Performed by: INTERNAL MEDICINE

## 2024-07-24 PROCEDURE — G8420 CALC BMI NORM PARAMETERS: HCPCS | Performed by: INTERNAL MEDICINE

## 2024-07-24 PROCEDURE — 3017F COLORECTAL CA SCREEN DOC REV: CPT | Performed by: INTERNAL MEDICINE

## 2024-07-24 PROCEDURE — G8399 PT W/DXA RESULTS DOCUMENT: HCPCS | Performed by: INTERNAL MEDICINE

## 2024-07-24 NOTE — PROGRESS NOTES
Sexual activity: Not on file   Other Topics Concern    Not on file   Social History Narrative    Not on file     Social Determinants of Health     Financial Resource Strain: Low Risk  (5/15/2024)    Overall Financial Resource Strain (CARDIA)     Difficulty of Paying Living Expenses: Not hard at all   Food Insecurity: No Food Insecurity (5/15/2024)    Hunger Vital Sign     Worried About Running Out of Food in the Last Year: Never true     Ran Out of Food in the Last Year: Never true   Transportation Needs: No Transportation Needs (5/15/2024)    PRAPARE - Transportation     Lack of Transportation (Medical): No     Lack of Transportation (Non-Medical): No   Physical Activity: Sufficiently Active (6/2/2024)    Exercise Vital Sign     Days of Exercise per Week: 4 days     Minutes of Exercise per Session: 50 min   Recent Concern: Physical Activity - Insufficiently Active (5/15/2024)    Exercise Vital Sign     Days of Exercise per Week: 4 days     Minutes of Exercise per Session: 20 min   Stress: No Stress Concern Present (5/15/2024)    [employer] - Occupational Stress Questionnaire     Feeling of Stress : Not at all   Social Connections: Moderately Isolated (5/15/2024)    Social Connection and Isolation Panel [NHANES]     Frequency of Communication with Friends and Family: More than three times a week     Frequency of Social Gatherings with Friends and Family: Three times a week     Attends Faith Services: More than 4 times per year     Active Member of Clubs or Organizations: No     Attends Club or Organization Meetings: Never     Marital Status:    Intimate Partner Violence: Not At Risk (6/3/2024)    Humiliation, Afraid, Rape, and Kick questionnaire     Fear of Current or Ex-Partner: No     Emotionally Abused: No     Physically Abused: No     Sexually Abused: No   Housing Stability: Low Risk  (5/15/2024)    Housing Stability Vital Sign     Unable to Pay for Housing in the Last

## 2024-07-25 ENCOUNTER — TELEPHONE (OUTPATIENT)
Age: 64
End: 2024-07-25

## 2024-07-25 RX ORDER — LEVOTHYROXINE SODIUM 0.05 MG/1
50 TABLET ORAL DAILY
Qty: 30 TABLET | Refills: 3 | Status: SHIPPED | OUTPATIENT
Start: 2024-07-25

## 2024-07-25 NOTE — TELEPHONE ENCOUNTER
Reason for call:  pt has some questions about the supplements she is supposed to be taking please call back    Is this a new problem: Yes    Date of last appointment:  7/24/2024     Can we respond via [employer]: No    Best call back number:     Al Cain (Self) [phone] (Mobile)

## 2024-07-25 NOTE — TELEPHONE ENCOUNTER
Returned call to patient, ID x 2. Pt wanted to know if she should take plain Vit D or Vit D2 or Vit D3, and can it be taken at the same time she takes calcium? Will forward to DM for review.

## 2024-09-13 DIAGNOSIS — F51.01 PRIMARY INSOMNIA: Primary | ICD-10-CM

## 2024-09-13 RX ORDER — ZOLPIDEM TARTRATE 10 MG/1
5 TABLET ORAL NIGHTLY PRN
Qty: 30 TABLET | Refills: 2 | Status: SHIPPED | OUTPATIENT
Start: 2024-09-13 | End: 2025-03-12

## 2024-09-26 ENCOUNTER — TELEPHONE (OUTPATIENT)
Age: 64
End: 2024-09-26

## 2024-09-26 DIAGNOSIS — E03.9 HYPOTHYROIDISM, UNSPECIFIED TYPE: Primary | ICD-10-CM

## 2024-09-26 DIAGNOSIS — E03.9 HYPOTHYROIDISM, UNSPECIFIED TYPE: ICD-10-CM

## 2024-09-26 LAB — TSH SERPL DL<=0.05 MIU/L-ACNC: 1.09 UIU/ML (ref 0.36–3.74)

## 2024-09-26 NOTE — TELEPHONE ENCOUNTER
Called pt, ID x 2. Advised pt to have repeat TSH done today, in order for results and 's recommendations to be given to her prior to leaving for  x 2 months. Pt stated she would need to leave Wednesday 10/3/24 d/t her mother being hospitalized after having a stroke. Pt was previously instructed to take Levothyroxine 50mcg then repeat in 3 months to see whether she should go back to he 75mcg dose. Explained to pt Dr. Novak would be back in the office 24 to review results and get back with her with his med recommendations.   Pt also requested a refill for Flexeril, as her last bottle (still full) from 10/29/23, will  while she is in the . Pt indicated she only takes it if necessary but would like to have it on hand. Will forward to PCP for review.

## 2024-09-26 NOTE — TELEPHONE ENCOUNTER
Patient was told to take Levothyroxine 50 MCG for 3 months, then Dr. Novak was going to check her levels to see it should be increased to 75 MCG.  She has been taking the 50 MCG for 2 months and has to leave to go to the [place], for a family emergency, sometime next week.  She will be there for at least 2 months.  Patient will need a 90 day supply of this medication, but she is unsure if it should be 50 or 75 MCG.    Al Cain - [phone]

## 2024-09-29 RX ORDER — CYCLOBENZAPRINE HCL 10 MG
5-10 TABLET ORAL 3 TIMES DAILY PRN
Qty: 30 TABLET | Refills: 0 | Status: SHIPPED | OUTPATIENT
Start: 2024-09-29 | End: 2024-10-09

## 2024-09-30 ENCOUNTER — TELEPHONE (OUTPATIENT)
Age: 64
End: 2024-09-30

## 2024-09-30 RX ORDER — LEVOTHYROXINE SODIUM 50 UG/1
50 TABLET ORAL DAILY
Qty: 90 TABLET | Refills: 1 | Status: SHIPPED | OUTPATIENT
Start: 2024-09-30

## 2024-09-30 NOTE — TELEPHONE ENCOUNTER
Patient is requesting a 90 day refill for Levothyroxine 50 MCG, since she will be leaving for the .    [facility]/pharmacy #[id number] - [place], [place] - [address] [phone] - F [phone]      Al Cain  [phone]

## 2024-09-30 NOTE — TELEPHONE ENCOUNTER
Chief Complaint   Patient presents with    Levothyroxine refill     SRJ     Last Appointment with Dr. Emery Novak:  7/24/2024   No future appointments.    Orders Placed This Encounter    levothyroxine (SYNTHROID) 50 MCG tablet     Sig: Take 1 tablet by mouth daily     Dispense:  90 tablet     Refill:  1       The above orders were approved via [facility]ORB per Dr. Emery Novak, III.

## 2025-01-26 RX ORDER — LEVOTHYROXINE SODIUM 50 UG/1
50 TABLET ORAL DAILY
Qty: 30 TABLET | Refills: 3 | Status: SHIPPED | OUTPATIENT
Start: 2025-01-26

## 2025-04-17 DIAGNOSIS — F51.01 PRIMARY INSOMNIA: ICD-10-CM

## 2025-04-17 RX ORDER — ZOLPIDEM TARTRATE 10 MG/1
5 TABLET ORAL NIGHTLY PRN
Qty: 30 TABLET | Refills: 2 | Status: SHIPPED | OUTPATIENT
Start: 2025-04-17 | End: 2025-10-14

## 2025-06-10 DIAGNOSIS — E03.9 HYPOTHYROIDISM, UNSPECIFIED TYPE: Primary | ICD-10-CM

## 2025-06-11 DIAGNOSIS — E03.9 HYPOTHYROIDISM, UNSPECIFIED TYPE: ICD-10-CM

## 2025-06-11 LAB
T4 FREE SERPL-MCNC: 1 NG/DL (ref 0.8–1.5)
TSH SERPL DL<=0.05 MIU/L-ACNC: 4.56 UIU/ML (ref 0.36–3.74)

## 2025-06-12 ENCOUNTER — RESULTS FOLLOW-UP (OUTPATIENT)
Age: 65
End: 2025-06-12

## 2025-06-12 DIAGNOSIS — E03.9 HYPOTHYROIDISM, UNSPECIFIED TYPE: Primary | ICD-10-CM

## 2025-06-12 DIAGNOSIS — M62.830 SPASM OF BACK MUSCLES: ICD-10-CM

## 2025-06-12 RX ORDER — CYCLOBENZAPRINE HCL 10 MG
5-10 TABLET ORAL 3 TIMES DAILY PRN
Qty: 30 TABLET | Refills: 0 | Status: SHIPPED | OUTPATIENT
Start: 2025-06-12 | End: 2025-06-22

## 2025-06-12 RX ORDER — LEVOTHYROXINE SODIUM 50 UG/1
TABLET ORAL
Qty: 100 TABLET | Refills: 1 | Status: SHIPPED | OUTPATIENT
Start: 2025-06-12